# Patient Record
Sex: FEMALE | Race: OTHER | URBAN - METROPOLITAN AREA
[De-identification: names, ages, dates, MRNs, and addresses within clinical notes are randomized per-mention and may not be internally consistent; named-entity substitution may affect disease eponyms.]

---

## 2017-04-04 ENCOUNTER — OFFICE VISIT (OUTPATIENT)
Dept: FAMILY MEDICINE CLINIC | Age: 27
End: 2017-04-04

## 2017-04-04 ENCOUNTER — HOSPITAL ENCOUNTER (OUTPATIENT)
Dept: LAB | Age: 27
Discharge: HOME | End: 2017-04-04

## 2017-04-04 VITALS
HEIGHT: 59 IN | TEMPERATURE: 98.2 F | HEART RATE: 82 BPM | WEIGHT: 174.6 LBS | SYSTOLIC BLOOD PRESSURE: 136 MMHG | BODY MASS INDEX: 35.2 KG/M2 | DIASTOLIC BLOOD PRESSURE: 69 MMHG

## 2017-04-04 DIAGNOSIS — E11.65 TYPE 2 DIABETES MELLITUS WITH HYPERGLYCEMIA, WITHOUT LONG-TERM CURRENT USE OF INSULIN (HCC): Primary | ICD-10-CM

## 2017-04-04 DIAGNOSIS — R73.9 ELEVATED BLOOD SUGAR: ICD-10-CM

## 2017-04-04 DIAGNOSIS — R73.9 ELEVATED BLOOD SUGAR: Primary | ICD-10-CM

## 2017-04-04 LAB
CREAT UR-MCNC: 35.3 MG/DL
EST. AVERAGE GLUCOSE BLD GHB EST-MCNC: 151 MG/DL
GLUCOSE POC: NORMAL MG/DL
HBA1C MFR BLD: 6.9 % (ref 4.2–6.3)
MICROALBUMIN UR-MCNC: 0.57 MG/DL
MICROALBUMIN/CREAT UR-RTO: 16 MG/G (ref 0–30)

## 2017-04-04 PROCEDURE — 83036 HEMOGLOBIN GLYCOSYLATED A1C: CPT | Performed by: FAMILY MEDICINE

## 2017-04-04 PROCEDURE — 82043 UR ALBUMIN QUANTITATIVE: CPT | Performed by: FAMILY MEDICINE

## 2017-04-04 NOTE — PROGRESS NOTES
Coordination of Care  1. Have you been to the ER, urgent care clinic since your last visit? Hospitalized since your last visit? No    2. Have you seen or consulted any other health care providers outside of the [address] [place] [address] since your last visit? Include any pap smears or colon screening.  No    Medications  Medication Reconciliation Performed: no  Patient does need refills     Learning Assessment Complete? yes  Results for orders placed or performed in visit on 04/04/17   AMB POC GLUCOSE BLOOD, BY GLUCOSE MONITORING DEVICE   Result Value Ref Range    Glucose POC non fasting 170 mg/dL

## 2017-04-04 NOTE — PROGRESS NOTES
Subjective:     Patricia Samson is a 32 y.o. female seen for follow up of diabetes. Has been eating more sweets, not really exercising. Noticed glucoses were high, up to 250 one day, and has been taking 4 metformin on some of those days, without side effects. She also has obesity. Diabetic Review of Systems - medication compliance: compliant all of the time, diabetic diet compliance: noncompliant much of the time. Other symptoms and concerns: none. .    Patient Active Problem List   Diagnosis Code    Hyperglycemia due to type 2 diabetes mellitus ([facility] [address] E11.65    Breastfeeding (infant) Z78.9     Family History   Problem Relation Age of Onset    Diabetes Father      Social History   Substance Use Topics    Smoking status: Never Smoker    Smokeless tobacco: Not on file    Alcohol use 0.0 oz/week     0 Standard drinks or equivalent per week      Comment: social        Lab Results  Component Value Date/Time   Cholesterol, total 210 03/15/2016 10:02 AM   HDL Cholesterol 38 03/15/2016 10:02 AM   LDL, calculated 143.2 03/15/2016 10:02 AM   Triglyceride 144 03/15/2016 10:02 AM   CHOL/HDL Ratio 5.5 03/15/2016 10:02 AM       No results found for: CGFR, GFRAA, GFRNA, CRCLT, [id number], CCT, JOSE ALFREDO, CREAPOC, MCREA, REFC7, ACREA, CREA, REFC3, REFC4, BUN, BUNPOC, IBUN, MBUNV, BUNV, NAPOC, NA, PNA, WBNA, K, KPOCT, KI, PLK, WBK, CLPOC, PCL, CL, WBCL, CO2, DIG, DIGP, MDIG      Lab Results   Component Value Date/Time    Hemoglobin A1c 6.3 07/19/2016 02:35 PM    Hemoglobin A1c 9.1 03/15/2016 10:02 AM         Objective:     Vitals 4/4/2017 11/29/2016 7/19/2016 5/27/2016 4/12/2016 2/23/2016   Blood Pressure 136/69 124/71 128/79 115/75 117/69 131/69   Pulse 82 79 82 75 78 73   Temp 98.2 98 98 98.3 98.1 98   Height 4' 10.976\" 4' 10.976\" 4' 11.252\" 4' 10.5\" - 4' 11.055\"   Weight 174 lb 9.6 oz 169 lb 170 lb 166 lb 163 lb 160 lb   BSA 1.82 m2 1.79 m2 1.8 m2 1.76 m2 - 1.74 m2   BMI 35.29 kg/m2 34.16 kg/m2 34.04 kg/m2 34.1 kg/m2 - 32.26 kg/m2     Appearance: alert, well appearing, and in no distress and overweight. Wt up 5 lb. Exam: heart sounds normal rate, regular rhythm, normal S1, S2, no murmurs, rubs, clicks or gallops, chest clear, no hepatosplenomegaly  Lab review:    Assessment/Plan:     diabetes control uncertain. Check A1C and will call her tomorrow if she needs to change dosing on the metformin. Next visit needs fasting lipids and cmp. ICD-10-CM ICD-9-CM    1.  Elevated blood sugar R73.9 790.29 AMB POC GLUCOSE BLOOD, BY GLUCOSE MONITORING DEVICE      HEMOGLOBIN A1C WITH EAG      MICROALBUMIN, UR, RAND W/ MICROALBUMIN/CREA RATIO

## 2017-04-05 DIAGNOSIS — E11.65 TYPE 2 DIABETES MELLITUS WITH HYPERGLYCEMIA, WITHOUT LONG-TERM CURRENT USE OF INSULIN (HCC): ICD-10-CM

## 2017-04-05 RX ORDER — METFORMIN HYDROCHLORIDE 500 MG/1
TABLET, EXTENDED RELEASE ORAL
Qty: 270 TAB | Refills: 1 | Status: SHIPPED | OUTPATIENT
Start: 2017-04-05 | End: 2017-07-19 | Stop reason: SDUPTHER

## 2017-04-05 NOTE — PROGRESS NOTES
Has been running higher glucoses lately and A1C has increased signif, and has been taking 4 metformin sometimes daily. So I am increasing daily dose to 1500mg.   Discussed with pt and sent Rx to pharmacy

## 2017-07-11 ENCOUNTER — CLINICAL SUPPORT (OUTPATIENT)
Dept: FAMILY MEDICINE CLINIC | Age: 27
End: 2017-07-11

## 2017-07-11 ENCOUNTER — HOSPITAL ENCOUNTER (OUTPATIENT)
Dept: LAB | Age: 27
Discharge: HOME | End: 2017-07-11

## 2017-07-11 DIAGNOSIS — Z13.9 ENCOUNTER FOR SCREENING: Primary | ICD-10-CM

## 2017-07-11 DIAGNOSIS — E11.65 TYPE 2 DIABETES MELLITUS WITH HYPERGLYCEMIA, WITHOUT LONG-TERM CURRENT USE OF INSULIN (HCC): ICD-10-CM

## 2017-07-11 LAB
ALBUMIN SERPL BCP-MCNC: 4.2 G/DL (ref 3.5–5)
ALBUMIN/GLOB SERPL: 1.1 {RATIO} (ref 1.1–2.2)
ALP SERPL-CCNC: 89 U/L (ref 45–117)
ALT SERPL-CCNC: 63 U/L (ref 12–78)
ANION GAP BLD CALC-SCNC: 10 MMOL/L (ref 5–15)
AST SERPL W P-5'-P-CCNC: 46 U/L (ref 15–37)
BILIRUB SERPL-MCNC: 0.3 MG/DL (ref 0.2–1)
BUN SERPL-MCNC: 7 MG/DL (ref 6–20)
BUN/CREAT SERPL: 13 (ref 12–20)
CALCIUM SERPL-MCNC: 9.2 MG/DL (ref 8.5–10.1)
CHLORIDE SERPL-SCNC: 100 MMOL/L (ref 97–108)
CHOLEST SERPL-MCNC: 198 MG/DL
CO2 SERPL-SCNC: 25 MMOL/L (ref 21–32)
CREAT SERPL-MCNC: 0.53 MG/DL (ref 0.55–1.02)
GLOBULIN SER CALC-MCNC: 3.8 G/DL (ref 2–4)
GLUCOSE SERPL-MCNC: 142 MG/DL (ref 65–100)
HDLC SERPL-MCNC: 31 MG/DL
HDLC SERPL: 6.4 {RATIO} (ref 0–5)
LDLC SERPL CALC-MCNC: 96 MG/DL (ref 0–100)
LIPID PROFILE,FLP: ABNORMAL
POTASSIUM SERPL-SCNC: 4.3 MMOL/L (ref 3.5–5.1)
PROT SERPL-MCNC: 8 G/DL (ref 6.4–8.2)
SODIUM SERPL-SCNC: 135 MMOL/L (ref 136–145)
TRIGL SERPL-MCNC: 355 MG/DL (ref ?–150)
VLDLC SERPL CALC-MCNC: 71 MG/DL

## 2017-07-11 PROCEDURE — 80053 COMPREHEN METABOLIC PANEL: CPT | Performed by: FAMILY MEDICINE

## 2017-07-11 PROCEDURE — 80061 LIPID PANEL: CPT | Performed by: FAMILY MEDICINE

## 2017-07-11 NOTE — PROGRESS NOTES
Patient here for fasting labs only per Dr. Andreea Jamil MD, labs drawn was an Lipid, CMP in the [place], patient left lab with no bleeding, no pain, and feeling well. Patient was advise that a Dr or Nurse will call with the results if abnormal and if normal no phone call will be done. Also the patient was advised she/he can discuss the results at next visit with the Dr. Sherita Rowland, Medical Assistant.

## 2017-07-19 ENCOUNTER — OFFICE VISIT (OUTPATIENT)
Dept: FAMILY MEDICINE CLINIC | Age: 27
End: 2017-07-19

## 2017-07-19 ENCOUNTER — HOSPITAL ENCOUNTER (OUTPATIENT)
Dept: LAB | Age: 27
Discharge: HOME | End: 2017-07-19

## 2017-07-19 VITALS
WEIGHT: 171 LBS | DIASTOLIC BLOOD PRESSURE: 84 MMHG | SYSTOLIC BLOOD PRESSURE: 130 MMHG | BODY MASS INDEX: 34.57 KG/M2 | HEART RATE: 84 BPM | TEMPERATURE: 98.7 F

## 2017-07-19 DIAGNOSIS — E11.65 TYPE 2 DIABETES MELLITUS WITH HYPERGLYCEMIA, WITHOUT LONG-TERM CURRENT USE OF INSULIN (HCC): ICD-10-CM

## 2017-07-19 DIAGNOSIS — E11.65 TYPE 2 DIABETES MELLITUS WITH HYPERGLYCEMIA, WITHOUT LONG-TERM CURRENT USE OF INSULIN (HCC): Primary | ICD-10-CM

## 2017-07-19 DIAGNOSIS — Z23 ENCOUNTER FOR IMMUNIZATION: ICD-10-CM

## 2017-07-19 LAB
EST. AVERAGE GLUCOSE BLD GHB EST-MCNC: 148 MG/DL
GLUCOSE POC: NORMAL MG/DL
HBA1C MFR BLD: 6.8 % (ref 4.2–6.3)

## 2017-07-19 PROCEDURE — 83036 HEMOGLOBIN GLYCOSYLATED A1C: CPT | Performed by: NURSE PRACTITIONER

## 2017-07-19 RX ORDER — METFORMIN HYDROCHLORIDE 500 MG/1
TABLET, EXTENDED RELEASE ORAL
Qty: 270 TAB | Refills: 1 | Status: SHIPPED | OUTPATIENT
Start: 2017-07-19 | End: 2017-12-20 | Stop reason: SDUPTHER

## 2017-07-19 NOTE — LETTER
2017 Ms. Sandy Robert  1990 # [id number] [facility] To Whom It May Concern: Ms. Sandy Robert needs her annual dilated retinal exam for her diabetes. After your examination, kindly fax the exam results including diabetes findings to 
[phone],   ATTENTION: Mg Schooling You may call our office at [phone] with any questions. Thank you! Sincerely, 
 
 
 
Dr. Juana Tavares, DNP, MSN, RN, FNP-BC, BC-ADM [facility]

## 2017-07-19 NOTE — PROGRESS NOTES
Coordination of Care  1. Have you been to the ER, urgent care clinic since your last visit? Hospitalized since your last visit? No    2. Have you seen or consulted any other health care providers outside of the [address] [place] [address] since your last visit? Include any pap smears or colon screening.  No    Medications  Medication Reconciliation Performed: no  Patient does need refills     Learning Assessment Complete? yes    Results for orders placed or performed in visit on 07/19/17   AMB POC GLUCOSE BLOOD, BY GLUCOSE MONITORING DEVICE   Result Value Ref Range    Glucose  non fasting mg/dL

## 2017-07-19 NOTE — PROGRESS NOTES
Taj Davison is a 32 y.o. female who presents for routine immunizations. Pt received Pneumococcal 23 today. She denies any symptoms , reactions or allergies that would exclude them from being immunized today. Risks and adverse reactions were discussed and the VIS was given to them. All questions were addressed. She was observed for 15 min post injection. There were no reactions observed.     Erlene Salines, LPN

## 2017-07-19 NOTE — PROGRESS NOTES
Assessment/Plan:       ICD-10-CM ICD-9-CM    1. Type 2 diabetes mellitus with hyperglycemia, without long-term current use of insulin (HCC) E11.65 250.00 AMB POC GLUCOSE BLOOD, BY GLUCOSE MONITORING DEVICE     790.29 metFORMIN ER (GLUCOPHAGE XR) 500 mg tablet      HEMOGLOBIN A1C WITH EAG   2. Encounter for immunization Z23 V03.89 PNEUMOCOCCAL POLYSACCHARIDE VACCINE, 23-VALENT, ADULT OR IMMUNOSUPPRESSED PT DOSE,    Greater than 50% of this 25 minute visit was spent in face-to-face counseling/coordination of care regarding diabetes management. Follow-up Disposition:  Return in about 3 months (around 10/19/2017) for diabetes. [facility]  Subjective:   Scott Parra is a 32 y.o. OTHER female who speaks Divehi. Chief Complaint   Patient presents with    Diabetes    Immunization/Injection   Brought in medications? yes Last took them: today, took 1 this morning, 2 at night; feels well. Measuring glucoses? 120, 130, 105, 170 a few times. Only when she went to a party. Last ate: today. Activity/exercise: she is walking inside the house, was scared of a man outside. ROS:   no polyuria or polydipsia, no chest pain, dyspnea or TIA's, no numbness, tingling or pain in extremities. Social History: She reports that she has never smoked. She has never used smokeless tobacco. She reports that she drinks alcohol. She reports that she does not use illicit drugs. Family History: Her family history includes Diabetes in her father. Outpatient Medications Prior to Visit   Medication Sig Dispense Refill    metFORMIN ER (GLUCOPHAGE XR) 500 mg tablet 1 am after breakfast, 2 pm after supper(1 despues de desayuno, dos despues de sean) 270 Tab 1     No facility-administered medications prior to visit. Surgical History: No past surgical history on file.   Objective:     Vitals:    07/19/17 1306   BP: 130/84   Pulse: 84   Temp: 98.7 °F (37.1 °C)   TempSrc: Oral   Weight: 171 lb (77.6 kg)    No LMP recorded. Patient is not currently having periods (Reason: IUD). Results for orders placed or performed in visit on 07/19/17   AMB POC GLUCOSE BLOOD, BY GLUCOSE MONITORING DEVICE   Result Value Ref Range    Glucose  non fasting mg/dL      Wt Readings from Last 2 Encounters:   07/19/17 171 lb (77.6 kg)   04/04/17 174 lb 9.6 oz (79.2 kg)    Weight decreased; Hemoglobin A1c   Date Value Ref Range Status   04/04/2017 6.9 (H) 4.2 - 6.3 % Final   07/19/2016 6.3 4.2 - 6.3 % Final    A1C increased but < 7   Lab Results   Component Value Date/Time    Microalbumin/Creat ratio (mg/g creat) 16 04/04/2017 11:18 AM    Microalbumin,urine random 0.57 04/04/2017 11:18 AM    Creatinine 0.53 07/11/2017 09:03 AM      Lab Results   Component Value Date/Time    GFR est AA >60 07/11/2017 09:03 AM    GFR est non-AA >60 07/11/2017 09:03 AM       Lab Results   Component Value Date/Time    Cholesterol, total 198 07/11/2017 09:03 AM    HDL Cholesterol 31 07/11/2017 09:03 AM    LDL, calculated 96 07/11/2017 09:03 AM    Triglyceride 355 07/11/2017 09:03 AM    CHOL/HDL Ratio 6.4 07/11/2017 09:03 AM      Lab Results   Component Value Date/Time    ALT (SGPT) 63 07/11/2017 09:03 AM    AST (SGOT) 46 07/11/2017 09:03 AM    Alk. phosphatase 89 07/11/2017 09:03 AM    Bilirubin, total 0.3 07/11/2017 09:03 AM     Constitutional: She appears well-developed. Eyes: EOM are normal. Pupils are equal, round, and reactive to light. Neck: Neck supple. No thyromegaly present. Cardiovascular: Normal rate, regular rhythm, normal heart sounds and intact distal pulses. No murmur heard. Pulmonary/Chest: Effort normal and breath sounds normal.   Musculoskeletal: She exhibits no edema. No ulcers of the lower extremities. Assessment/Plan:   Diagnoses and all orders for this visit:    1.  Type 2 diabetes mellitus with hyperglycemia, without long-term current use of insulin (HCC)  -     AMB POC GLUCOSE BLOOD, BY GLUCOSE MONITORING DEVICE  -     metFORMIN ER (GLUCOPHAGE XR) 500 mg tablet; 1 am after breakfast, 2 pm after supper(1 despues de desayuno, dos despues de sean)  -     HEMOGLOBIN A1C WITH EAG; Future    2. Encounter for immunization  -     Pneumococcal polysaccharide vaccine, 23-valent, adult or immunosuppressed pt dose    Diabetes Mellitus type 2, under Good  control. Blood pressure under Good control. Greater than 50% of this 25 minute visit was spent in face-to-face counseling/coordination of care regarding diabetes management. Follow-up Disposition:  Return in about 3 months (around 10/19/2017) for diabetes. I gave her eye letter to take to optometry re: diabetes eye exam.  Last one 4/2016 and attention to diabetes not done. Jovan Gibson, MSN, RN, FNP-BC, BC-ADM  Aroldo Blanton expressed understanding of this plan.

## 2017-12-20 ENCOUNTER — OFFICE VISIT (OUTPATIENT)
Dept: FAMILY MEDICINE CLINIC | Age: 27
End: 2017-12-20

## 2017-12-20 ENCOUNTER — HOSPITAL ENCOUNTER (OUTPATIENT)
Dept: LAB | Age: 27
Discharge: HOME | End: 2017-12-20

## 2017-12-20 VITALS
WEIGHT: 168 LBS | HEIGHT: 59 IN | HEART RATE: 75 BPM | BODY MASS INDEX: 33.87 KG/M2 | TEMPERATURE: 97.3 F | DIASTOLIC BLOOD PRESSURE: 82 MMHG | SYSTOLIC BLOOD PRESSURE: 122 MMHG

## 2017-12-20 DIAGNOSIS — Z23 ENCOUNTER FOR IMMUNIZATION: ICD-10-CM

## 2017-12-20 DIAGNOSIS — Z13.1 SCREENING FOR DIABETES MELLITUS: ICD-10-CM

## 2017-12-20 DIAGNOSIS — E11.65 TYPE 2 DIABETES MELLITUS WITH HYPERGLYCEMIA, WITHOUT LONG-TERM CURRENT USE OF INSULIN (HCC): Primary | ICD-10-CM

## 2017-12-20 DIAGNOSIS — E11.65 TYPE 2 DIABETES MELLITUS WITH HYPERGLYCEMIA, WITHOUT LONG-TERM CURRENT USE OF INSULIN (HCC): ICD-10-CM

## 2017-12-20 LAB
EST. AVERAGE GLUCOSE BLD GHB EST-MCNC: 166 MG/DL
GLUCOSE POC: NORMAL MG/DL
HBA1C MFR BLD: 7.4 % (ref 4.2–6.3)

## 2017-12-20 PROCEDURE — 83036 HEMOGLOBIN GLYCOSYLATED A1C: CPT | Performed by: NURSE PRACTITIONER

## 2017-12-20 RX ORDER — METFORMIN HYDROCHLORIDE 500 MG/1
TABLET, EXTENDED RELEASE ORAL
Qty: 270 TAB | Refills: 1 | Status: SHIPPED | OUTPATIENT
Start: 2017-12-20 | End: 2018-01-11 | Stop reason: SDUPTHER

## 2017-12-20 NOTE — PROGRESS NOTES
Assessment/Plan:       ICD-10-CM ICD-9-CM    1. Type 2 diabetes mellitus with hyperglycemia, without long-term current use of insulin (HCC) E11.65 250.00 HEMOGLOBIN A1C WITH EAG     790.29 metFORMIN ER (GLUCOPHAGE XR) 500 mg tablet   2. Screening for diabetes mellitus Z13.1 V77.1 AMB POC GLUCOSE BLOOD, BY GLUCOSE MONITORING DEVICE    Greater than 50% of this 25 minute visit was spent in face-to-face counseling/coordination of care regarding diabetes management. Follow-up Disposition: Not on File Changes made:      [facility] [address], [address] [facility] [address] [place] [address] [address]  [address] [facility] [address]  Phone: [phone] Fax: [phone]      [facility]  Subjective:     Chief Complaint   Patient presents with    Diabetes     follow up     Chen Olivares is a 32 y.o. OTHER female who speaks Ukrainian. Last night/this morning or typical day, medications/food:   Work (in or outside the home): yes   Physical Activity in addition to working? yes  Measuring glucoses? yes and 15 days ago, 150, 160  Outpatient Medications Prior to Visit   Medication Sig Dispense Refill    metFORMIN ER (GLUCOPHAGE XR) 500 mg tablet 1 am after breakfast, 2 pm after supper(1 despues de desayuno, dos despues de sean) 270 Tab 1     No facility-administered medications prior to visit. Brought in medications? no Taking medications as prescribed? Yes Exercise: was walking; but now since cold she has a machine. ROS:   no polyuria or polydipsia, no chest pain, dyspnea or TIA's, no numbness, tingling or pain in extremities. Social History: She reports that she has never smoked. She has never used smokeless tobacco. She reports that she drinks alcohol. She reports that she does not use illicit drugs. Family History: Her family history includes Diabetes in her father. Surgical History: No past surgical history on file.   Objective:     Vitals:    12/20/17 1304   BP: 122/82 Pulse: 75   Temp: 97.3 °F (36.3 °C)   TempSrc: Oral   Weight: 168 lb (76.2 kg)   Height: 4' 11.25\" (1.505 m)    No LMP recorded. Patient is not currently having periods (Reason: IUD). Results for orders placed or performed in visit on 12/20/17   AMB POC GLUCOSE BLOOD, BY GLUCOSE MONITORING DEVICE   Result Value Ref Range    Glucose   non fasting mg/dL      Wt Readings from Last 2 Encounters:   12/20/17 168 lb (76.2 kg)   07/19/17 171 lb (77.6 kg)    Weight decreased; Hemoglobin A1c   Date Value Ref Range Status   07/19/2017 6.8 (H) 4.2 - 6.3 % Final   04/04/2017 6.9 (H) 4.2 - 6.3 % Final    A1C decreased;   Lab Results   Component Value Date/Time    Microalbumin/Creat ratio (mg/g creat) 16 04/04/2017 11:18 AM    Microalbumin,urine random 0.57 04/04/2017 11:18 AM    Creatinine 0.53 07/11/2017 09:03 AM      Lab Results   Component Value Date/Time    GFR est AA >60 07/11/2017 09:03 AM    GFR est non-AA >60 07/11/2017 09:03 AM       Lab Results   Component Value Date/Time    Cholesterol, total 198 07/11/2017 09:03 AM    HDL Cholesterol 31 07/11/2017 09:03 AM    LDL, calculated 96 07/11/2017 09:03 AM    Triglyceride 355 07/11/2017 09:03 AM    CHOL/HDL Ratio 6.4 07/11/2017 09:03 AM      Lab Results   Component Value Date/Time    ALT (SGPT) 63 07/11/2017 09:03 AM    AST (SGOT) 46 07/11/2017 09:03 AM    Alk. phosphatase 89 07/11/2017 09:03 AM    Bilirubin, total 0.3 07/11/2017 09:03 AM     Constitutional: She appears well-developed. Eyes: EOM are normal. Pupils are equal, round, and reactive to light. Neck: Neck supple. No thyromegaly present. Cardiovascular: Normal rate, regular rhythm, normal heart sounds and intact distal pulses. No murmur heard. Pulmonary/Chest: Effort normal and breath sounds normal.   Musculoskeletal: She exhibits no edema. No ulcers of the lower extremities. Assessment/Plan:   Diagnoses and all orders for this visit:    1.  Type 2 diabetes mellitus with hyperglycemia, without long-term current use of insulin (HCC)  -     HEMOGLOBIN A1C WITH EAG; Future  -     metFORMIN ER (GLUCOPHAGE XR) 500 mg tablet; 1 am after breakfast, 2 pm after supper(1 despues de desayuno, dos despues de sean)    2. Screening for diabetes mellitus  -     AMB POC GLUCOSE BLOOD, BY GLUCOSE MONITORING DEVICE    OK for flu vaccine; labs today; may have return appointment 5 months (has enough meds for 6 months); keep up the good work with exercising on the machine in her house  Diabetes Mellitus type 2, under Good control. Blood pressure under Good control. Greater than 50% of this 25 minute visit was spent in face-to-face counseling/coordination of care regarding diabetes management. Alexey Crowder DNP, FNP-BC, BC-ADM  Santos Marroquin expressed understanding of this plan.

## 2017-12-20 NOTE — MR AVS SNAPSHOT
Visit Information Nancy Clark Personal Médico Departamento Teléfono del Dep. Número de visita  
 12/20/2017  1:00 PM Devaughn Garg NP [address] [phone] Follow-up Instructions Return in about 5 months (around 5/20/2018) for primary care needs 5-6 months. Upcoming Health Maintenance Date Due  
 EYE EXAM RETINAL OR DILATED Q1 4/11/2017 Influenza Age 5 to Adult 8/1/2017 FOOT EXAM Q1 11/29/2017 HEMOGLOBIN A1C Q6M 1/19/2018 MICROALBUMIN Q1 4/4/2018 LIPID PANEL Q1 7/11/2018 PAP AKA CERVICAL CYTOLOGY 9/8/2019 DTaP/Tdap/Td series (2 - Td) 10/27/2024 [place]  Review Complete El: 12/20/2017 Por: Devaughn Garg NP  
 Mickeal Augustine del:  12/20/2017 Intensidad Anotado Tipo de reacción [employer] Pork Derived (Porcine)  02/23/2016    Rash Vacunas actuales AGRYWRAFK el:  11/29/2016 Pat Nation Influenza Vaccine (Quad) PF 11/29/2016 Pneumococcal Polysaccharide (PPSV-23) 7/19/2017 No revisadas esta visita You Were Diagnosed With   
  
 Tereza Balderas Type 2 diabetes mellitus with hyperglycemia, without long-term current use of insulin (HCC)    -  Primary ICD-10-CM: E11.65 ICD-9-CM: 250.00, 790.29 Screening for diabetes mellitus     ICD-10-CM: Z13.1 ICD-9-CM: V77.1 Partes vitales PS Pulso Temperatura [place] ( percentil de crecimiento) Peso (percentil de crecimiento) BMI (IMC)  
 122/82 (BP 1 Location: Left arm, BP Patient Position: Sitting) 75 97.3 °F (36.3 °C) (Oral) 4' 11.25\" (1.505 m) 168 lb (76.2 kg) 33.64 kg/m2 Estado obstétrico Estatus de tabaquísmo IUD Never Smoker Historial de signos vitales BMI and BSA Data Body Mass Index Body Surface Area  
 33.64 kg/m 2 1.78 m 2 [facility] Name Phone [facility], [address] [place] [address] Decker lista de medicamentos actualizada Lista actualizada el: 17  2:23 PM.  Lawerence Coup use decker lista de medicamentos más reciente. metFORMIN  mg tablet También conocido milagro:  GLUCOPHAGE XR  
1 am after breakfast, 2 pm after supper(1 despues de desayuno, dos despues de sean) Recetas Enviado a la [place] Refills  
 metFORMIN ER (GLUCOPHAGE XR) 500 mg tablet 1 Si am after breakfast, 2 pm after supper(1 despues de desayuno, dos despues de sean) Class: Normal  
 Pharmacy: [facility] [id number] [place] [address], [address] [place] [address] #: [phone] Hicimos lo siguiente AMB POC GLUCOSE BLOOD, BY GLUCOSE MONITORING DEVICE [97928 CPT(R)] Instrucciones de seguimiento Return in about 5 months (around 2018) for primary care needs 5-6 months. Introducing [facility]! [facility] introduce portal paciente MyChart . Ahora se puede acceder a partes de decker expediente médico, enviar por correo electrónico la oficina de decker médico y solicitar renovaciones de medicamentos en línea. En decker navegador de Internet , Berto McAndrews a [email or web address] Amrit clic en el usuario por Shanti Halon? Tawana Pagoda clic aquí en la sesión Evern Blazing. Verá la página de registro [place]. Ingrese decker código de [employer] meng y milagro aparece a continuación. Usted no tendrá que [employer] código después de kenny completado el proceso de registro . Si usted no se inscribe antes de la fecha de caducidad , debe solicitar un nuevo código. · MyChart Código de acceso : [id number] Expires: 3/20/2018  2:23 PM 
 
Ingresa los últimos cuatro dígitos de decker Número de Seguro Social ( xxxx ) y fecha de nacimiento ( dd / mm / aaaa ) milagro se indica y amrit clic en Enviar. Usted será llevado a la siguiente página de registro . Crear un ID MyChart .  Esta será decker ID de inicio de sesión de MyChart y no puede ser [place] , por lo que pensar en karla que es workman y fácil de recordar . Crear karla contraseña MyChart . Usted puede cambiar decker contraseña en cualquier momento . Ingrese decker Password Reset de preguntas y [place] . [place] se puede utilizar en un momento posterior si usted olvida decker contraseña. Introduzca decker dirección de correo electrónico . Mathew Baer recibirá karla notificación por correo electrónico cuando la nueva información está disponible en MyChart . Carter Clay clic en Registrarse. [place] tomas y descargar porciones de decker expediente médico. 
Liseth clic en el enlace de descarga del menú Resumen para descargar karla copia portátil de decker información médica . Si tiene [employer] , por favor visite la sección de preguntas frecuentes del sitio web MyChart . Recuerde, MyChart NO es que se utilizará para las necesidades urgentes. Para emergencias médicas , llame al 911 . Ahora disponible en decker iPhone y Android ! Por favor proporcione prashanth resumen de la documentación de cuidado a decker próximo proveedor. If you have any questions after today's visit, please call [phone].

## 2017-12-20 NOTE — PROGRESS NOTES
Coordination of Care  1. Have you been to the ER, urgent care clinic since your last visit? Hospitalized since your last visit? No    2. Have you seen or consulted any other health care providers outside of the [address] [place] [address] since your last visit? Include any pap smears or colon screening. No    Medications  Does the patient need refills?  YES    Learning Assessment Complete? yes    Results for orders placed or performed in visit on 12/20/17   AMB POC GLUCOSE BLOOD, BY GLUCOSE MONITORING DEVICE   Result Value Ref Range    Glucose   non fasting mg/dL

## 2017-12-20 NOTE — PROGRESS NOTES
Avs discussed with Attila Escobedo by Discharge Nurse Dianne Olmos LPN with  [facility]. Discussed medication prescribed today. Pt will receive flu shot today. AVS printed and given to patient Dianne Olmos LPN         Requests flu vaccine. Denies fever and egg allergy. VIS information sheet given to patient. Explained possible s/e. Reviewed s/sx indicating need to be seen in ER. Patient had no adverse reaction at time of discharge. Entered into VIIS.

## 2017-12-21 NOTE — PROGRESS NOTES
A1c has gone up to 7.4, should be under 7. If you have been consistently taking 3 metformins a day, we need you to increase to 4 metformins a day (2 po bid). Return 3 months instead of 5 months.

## 2018-01-10 NOTE — PROGRESS NOTES
I called pt today with [employer]  # [phone] and gave message from provider. Pt is taking Metformin as prescribed. She will increase it to 2 po BID. She knows to RTC in 3 months.  Reina Whitfield RN

## 2018-01-11 DIAGNOSIS — E11.65 TYPE 2 DIABETES MELLITUS WITH HYPERGLYCEMIA, WITHOUT LONG-TERM CURRENT USE OF INSULIN (HCC): ICD-10-CM

## 2018-01-11 RX ORDER — METFORMIN HYDROCHLORIDE 500 MG/1
TABLET, EXTENDED RELEASE ORAL
Qty: 360 TAB | Refills: 1 | Status: SHIPPED | OUTPATIENT
Start: 2018-01-11 | End: 2018-10-03 | Stop reason: SDUPTHER

## 2018-10-03 DIAGNOSIS — E11.65 TYPE 2 DIABETES MELLITUS WITH HYPERGLYCEMIA, WITHOUT LONG-TERM CURRENT USE OF INSULIN (HCC): ICD-10-CM

## 2018-10-03 RX ORDER — METFORMIN HYDROCHLORIDE 500 MG/1
TABLET, EXTENDED RELEASE ORAL
Qty: 360 TAB | Refills: 0 | Status: SHIPPED | OUTPATIENT
Start: 2018-10-03 | End: 2018-10-04 | Stop reason: SDUPTHER

## 2018-10-03 NOTE — TELEPHONE ENCOUNTER
Has appt on 11/12/18 scheduled but is running out of medicine. Will refill per registration who scheduled f/u appt. Given 3 months, no refills.

## 2018-10-04 DIAGNOSIS — E11.65 TYPE 2 DIABETES MELLITUS WITH HYPERGLYCEMIA, WITHOUT LONG-TERM CURRENT USE OF INSULIN (HCC): Primary | ICD-10-CM

## 2018-10-04 RX ORDER — METFORMIN HYDROCHLORIDE 500 MG/1
TABLET, EXTENDED RELEASE ORAL
Qty: 360 TAB | Refills: 0 | Status: SHIPPED | OUTPATIENT
Start: 2018-10-04 | End: 2018-11-12

## 2018-11-12 ENCOUNTER — OFFICE VISIT (OUTPATIENT)
Dept: FAMILY MEDICINE CLINIC | Age: 28
End: 2018-11-12

## 2018-11-12 VITALS
DIASTOLIC BLOOD PRESSURE: 75 MMHG | BODY MASS INDEX: 30.6 KG/M2 | WEIGHT: 152.8 LBS | TEMPERATURE: 98.1 F | HEART RATE: 67 BPM | SYSTOLIC BLOOD PRESSURE: 112 MMHG

## 2018-11-12 DIAGNOSIS — Z13.9 ENCOUNTER FOR SCREENING: ICD-10-CM

## 2018-11-12 DIAGNOSIS — E11.9 CONTROLLED TYPE 2 DIABETES MELLITUS WITHOUT COMPLICATION, WITHOUT LONG-TERM CURRENT USE OF INSULIN (HCC): Primary | ICD-10-CM

## 2018-11-12 DIAGNOSIS — Z3A.10 10 WEEKS GESTATION OF PREGNANCY: ICD-10-CM

## 2018-11-12 LAB — GLUCOSE POC: NORMAL MG/DL

## 2018-11-12 NOTE — Clinical Note
Patient is 10 weeks pregnant and has stopped all diabetes medication. Her fasting glucoses range from 112 to 150, average of 125. Her measures 2 hours after breakfast range from 93 to 160, average 120. With [facility] closing and no insurance, what is the best way for me to refer her to you to manage type 2 diabetes in pregnancy?

## 2018-11-12 NOTE — PROGRESS NOTES
Coordination of Care  1. Have you been to the ER, urgent care clinic since your last visit? Hospitalized since your last visit? No    2. Have you seen or consulted any other health care providers outside of the [address] [place] [address] since your last visit? Include any pap smears or colon screening. Yes When: Clinic in [place] for pregnancy    Does the patient need refills? YES    Learning Assessment Complete?  yes  Depression Screening complete in the past 12 months? yes     Results for orders placed or performed in visit on 11/12/18   AMB POC GLUCOSE BLOOD, BY GLUCOSE MONITORING DEVICE   Result Value Ref Range    Glucose POC nf 78 mg/dL

## 2018-11-12 NOTE — PROGRESS NOTES
Assessment/Plan:       ICD-10-CM ICD-9-CM    1. Controlled type 2 diabetes mellitus without complication, without long-term current use of insulin ([facility]) E11.9 250.00    2. Encounter for screening Z13.9 V82.9 AMB POC GLUCOSE BLOOD, BY GLUCOSE MONITORING DEVICE   3. 10 weeks gestation of pregnancy Z3A.10 V22.2     Plan follow up with endocrinology. Follow-up Disposition: Not on [address]  [address] [facility] [address]  Phone: [phone] Fax: [phone]      [facility]  Subjective:     Chief Complaint   Patient presents with    Other     patient is pregnant and has stopped medication for diabetes    Marc Villaseñor is a 25 y.o. OTHER female who speaks Albanian.  used? yes She has kept physically active. Her first check up is November 30. HPI: She is 10 weeks pregnant. Going to [place] clinic. ROS:   No increased frequency of urination, no increased thirst; no chest pain, dyspnea or TIA's; no numbness, tingling or pain in extremities; no reports of hypoglycemia. Diabetes   Brought in medications? Off meds for 1 month. Medications:  No current outpatient medications on file. Social History: She reports that  has never smoked. she has never used smokeless tobacco. She reports that she drinks alcohol. She reports that she does not use drugs. Family History: Her family history includes Diabetes in her father. Objective:     Vitals:    11/12/18 1334   BP: 112/75   Pulse: 67   Temp: 98.1 °F (36.7 °C)   TempSrc: Oral   Weight: 152 lb 12.8 oz (69.3 kg)    No LMP recorded. Patient is pregnant.     Results for orders placed or performed in visit on 11/12/18   AMB POC GLUCOSE BLOOD, BY GLUCOSE MONITORING DEVICE   Result Value Ref Range    Glucose POC nf 78 mg/dL      Wt Readings from Last 2 Encounters:   11/12/18 152 lb 12.8 oz (69.3 kg)   12/20/17 168 lb (76.2 kg)    Weight decreased; Hemoglobin A1c   Date Value Ref Range Status   12/20/2017 7.4 (H) 4.2 - 6.3 % Final   07/19/2017 6.8 (H) 4.2 - 6.3 % Final    A1C increased;   Lab Results   Component Value Date/Time    Microalbumin/Creat ratio (mg/g creat) 16 04/04/2017 11:18 AM    Microalbumin,urine random 0.57 04/04/2017 11:18 AM    Creatinine 0.53 (L) 07/11/2017 09:03 AM      Lab Results   Component Value Date/Time    GFR est AA >60 07/11/2017 09:03 AM    GFR est non-AA >60 07/11/2017 09:03 AM       Lab Results   Component Value Date/Time    Cholesterol, total 198 07/11/2017 09:03 AM    HDL Cholesterol 31 07/11/2017 09:03 AM    LDL, calculated 96 07/11/2017 09:03 AM    Triglyceride 355 (H) 07/11/2017 09:03 AM    CHOL/HDL Ratio 6.4 (H) 07/11/2017 09:03 AM      Lab Results   Component Value Date/Time    ALT (SGPT) 63 07/11/2017 09:03 AM    AST (SGOT) 46 (H) 07/11/2017 09:03 AM    Alk. phosphatase 89 07/11/2017 09:03 AM    Bilirubin, total 0.3 07/11/2017 09:03 AM     Constitutional: She appears well-developed. Eyes: EOM are normal. Pupils are equal, round, and reactive to light. Neck: Neck supple. No thyromegaly present. Cardiovascular: Normal rate, regular rhythm, normal heart sounds and intact distal pulses. No murmur heard. Pulmonary/Chest: Effort normal and breath sounds normal.   Musculoskeletal: She exhibits no edema. No ulcers of the lower extremities. Assessment/Plan:   Diagnoses and all orders for this visit:    1. Controlled type 2 diabetes mellitus without complication, without long-term current use of insulin ([address]    2. Encounter for screening  -     AMB POC GLUCOSE BLOOD, BY GLUCOSE MONITORING DEVICE    3. 10 weeks gestation of pregnancy    DO NOT DRINK ALCOHOL. Follow-up Disposition: Not on File      Shine Sheldon DNP, FNP-BC, BC-ADM  Valeria Pastrana expressed understanding of this plan.

## 2018-11-12 NOTE — PATIENT INSTRUCTIONS
Lab Results   Component Value Date/Time    Hemoglobin A1c 7.4 (H) 12/20/2017 02:32 PM    Hemoglobin A1c 6.8 (H) 07/19/2017 01:50 PM    Hemoglobin A1c 6.9 (H) 04/04/2017 11:18 AM     Wt Readings from Last 3 Encounters:   11/12/18 152 lb 12.8 oz (69.3 kg)   12/20/17 168 lb (76.2 kg)   07/19/17 171 lb (77.6 kg)     She was diagnosed with type 2 diabetes on 12/20/17. She has lost weight since then. Her fasting glucoses are ranging 112 to 150 at 10 weeks pregnant. I am recommending a referral to endocrinology for management of type 2 diabetes vs gestational diabetes during pregnancy.

## 2018-11-12 NOTE — PROGRESS NOTES
AVS printed, given and reviewed. This has been fully explained to the patient, who indicates understanding and agrees with plan. No further questions at this time.  Jed Veliz RN

## 2018-11-12 NOTE — PROGRESS NOTES
Zenobia Saluda am transitioning my clinic from [address] [place] [address] to [address] [place] [address] on the 1st Wed of the month and [place] on the 3rd Wed of the month starting in January. I suggest she restart her metformin and begin glyburide 2.5 mg before dinner to help keep her fasting sugars under 95 in the morning. We can try to see if she can come to [facility] sometime this winter as I am full until then. Thanks,  Lena Olson  ===View-only below this line===    ----- Message -----  From: Neptali Ribera NP  Sent: 11/12/2018   1:56 PM  To: Patrick Monroe MD    Patient is 10 weeks pregnant and has stopped all diabetes medication. Her fasting glucoses range from 112 to 150, average of 125. Her measures 2 hours after breakfast range from 93 to 160, average 120. With [facility] and no insurance, what is the best way for me to refer her to you to manage type 2 diabetes in pregnancy?

## 2018-11-17 ENCOUNTER — TELEPHONE (OUTPATIENT)
Dept: FAMILY MEDICINE CLINIC | Age: 28
End: 2018-11-17

## 2018-11-17 DIAGNOSIS — O24.111 PRE-EXISTING TYPE 2 DIABETES MELLITUS DURING PREGNANCY IN FIRST TRIMESTER: Primary | ICD-10-CM

## 2018-11-17 RX ORDER — METFORMIN HYDROCHLORIDE 500 MG/1
500 TABLET ORAL
Qty: 180 TAB | Refills: 0 | Status: SHIPPED | OUTPATIENT
Start: 2018-11-17 | End: 2019-11-08

## 2018-11-17 RX ORDER — GLYBURIDE 2.5 MG/1
2.5 TABLET ORAL
Qty: 90 TAB | Refills: 0 | Status: SHIPPED | OUTPATIENT
Start: 2018-11-17 | End: 2019-11-08

## 2018-11-17 NOTE — TELEPHONE ENCOUNTER
Per Dr. Elo Ortiz, patient is to restart metformin and to take glyburide 2.5 mg before dinner. Keep fasting glucoses under 95 in the morning. He is at [facility] every 3rd Wednesday of the month. He recommends she follow up with him sometime this winter, as he is booked until then. I called patient with  Lizbeth Becker to convey the following:  Start this week taking metformin 500mg after breakfast.  Also take glyburide 2.5mg before dinner. Measure fasting glucoses. These should be under 95. Starting week 2, add the second metformin 500mg after dinner. These were sent to the [facility] on [address]: turn off the lights. Patient will need to be called to have a new patient appointment scheduled with Dr. Elo Ortiz.

## 2019-09-24 ENCOUNTER — TELEPHONE (OUTPATIENT)
Dept: FAMILY MEDICINE CLINIC | Age: 29
End: 2019-09-24

## 2019-09-24 DIAGNOSIS — E11.65 TYPE 2 DIABETES MELLITUS WITH HYPERGLYCEMIA, WITHOUT LONG-TERM CURRENT USE OF INSULIN (HCC): Primary | ICD-10-CM

## 2019-09-24 NOTE — TELEPHONE ENCOUNTER
Homa Otero wanted to know if she can have a F/U apt with a provider and if she can have some more med's refill since she is completely out of medicine . Patient haven't been to [place] [address] since 11/2018 .      Thank you

## 2019-09-25 NOTE — TELEPHONE ENCOUNTER
I prescribed #90 of her medications in 11/2018. This means that she has been out of medications since 2/2019, i.e. For the last 7 months. I recommend offering first available at [facility]. 2 weeks prior to that appointment I will have her return for FASTING labs which have been ordered. Diagnoses and all orders for this visit:    1. Type 2 diabetes mellitus with hyperglycemia, without long-term current use of insulin (HCC)  -     HEMOGLOBIN A1C WITH EAG; Future  -     METABOLIC PANEL, COMPREHENSIVE; Future  -     HCG QL SERUM; Future  -     MICROALBUMIN, UR, RAND W/ MICROALB/CREAT RATIO; Future  -     LIPID PANEL;  Future

## 2019-10-03 NOTE — TELEPHONE ENCOUNTER
Routing to call back reg to have appointment scheduled for [facility] with Lan Horn.  Tereza Au RN

## 2019-10-05 ENCOUNTER — LAB ONLY (OUTPATIENT)
Dept: FAMILY MEDICINE CLINIC | Age: 29
End: 2019-10-05

## 2019-10-05 DIAGNOSIS — E11.65 TYPE 2 DIABETES MELLITUS WITH HYPERGLYCEMIA, WITHOUT LONG-TERM CURRENT USE OF INSULIN (HCC): ICD-10-CM

## 2019-10-05 NOTE — PROGRESS NOTES
Patient came in for a lab only visit per Mildred Kong. Lipid, HCG, CMP, and A1C was drawn from the L-AC without complications. A urin was obtained for a Microalbumin. Results pending.

## 2019-11-08 ENCOUNTER — OFFICE VISIT (OUTPATIENT)
Dept: FAMILY MEDICINE CLINIC | Age: 29
End: 2019-11-08

## 2019-11-08 ENCOUNTER — HOSPITAL ENCOUNTER (OUTPATIENT)
Dept: LAB | Age: 29
Discharge: HOME | End: 2019-11-08

## 2019-11-08 VITALS
BODY MASS INDEX: 34.47 KG/M2 | HEART RATE: 76 BPM | TEMPERATURE: 98.6 F | HEIGHT: 59 IN | DIASTOLIC BLOOD PRESSURE: 69 MMHG | SYSTOLIC BLOOD PRESSURE: 138 MMHG | WEIGHT: 171 LBS

## 2019-11-08 DIAGNOSIS — E11.65 TYPE 2 DIABETES MELLITUS WITH HYPERGLYCEMIA, UNSPECIFIED WHETHER LONG TERM INSULIN USE (HCC): Primary | ICD-10-CM

## 2019-11-08 DIAGNOSIS — Z23 ENCOUNTER FOR IMMUNIZATION: ICD-10-CM

## 2019-11-08 LAB
ALBUMIN SERPL-MCNC: 3.8 G/DL (ref 3.5–5)
ALBUMIN/GLOB SERPL: 1 {RATIO} (ref 1.1–2.2)
ALP SERPL-CCNC: 68 U/L (ref 45–117)
ALT SERPL-CCNC: 22 U/L (ref 12–78)
ANION GAP SERPL CALC-SCNC: 5 MMOL/L (ref 5–15)
AST SERPL-CCNC: 14 U/L (ref 15–37)
BASOPHILS # BLD: 0 K/UL (ref 0–0.1)
BASOPHILS NFR BLD: 1 % (ref 0–1)
BILIRUB SERPL-MCNC: 0.3 MG/DL (ref 0.2–1)
BUN SERPL-MCNC: 9 MG/DL (ref 6–20)
BUN/CREAT SERPL: 18 (ref 12–20)
CALCIUM SERPL-MCNC: 9.1 MG/DL (ref 8.5–10.1)
CHLORIDE SERPL-SCNC: 103 MMOL/L (ref 97–108)
CO2 SERPL-SCNC: 29 MMOL/L (ref 21–32)
CREAT SERPL-MCNC: 0.51 MG/DL (ref 0.55–1.02)
CREAT UR-MCNC: 95.8 MG/DL
DIFFERENTIAL METHOD BLD: NORMAL
EOSINOPHIL # BLD: 0.1 K/UL (ref 0–0.4)
EOSINOPHIL NFR BLD: 1 % (ref 0–7)
ERYTHROCYTE [DISTWIDTH] IN BLOOD BY AUTOMATED COUNT: 11.9 % (ref 11.5–14.5)
EST. AVERAGE GLUCOSE BLD GHB EST-MCNC: 151 MG/DL
GLOBULIN SER CALC-MCNC: 3.7 G/DL (ref 2–4)
GLUCOSE POC: NORMAL MG/DL
GLUCOSE SERPL-MCNC: 169 MG/DL (ref 65–100)
HBA1C MFR BLD: 6.9 % (ref 4.2–6.3)
HCT VFR BLD AUTO: 37.2 % (ref 35–47)
HGB BLD-MCNC: 12.4 G/DL (ref 11.5–16)
IMM GRANULOCYTES # BLD AUTO: 0 K/UL (ref 0–0.04)
IMM GRANULOCYTES NFR BLD AUTO: 0 % (ref 0–0.5)
LYMPHOCYTES # BLD: 3 K/UL (ref 0.8–3.5)
LYMPHOCYTES NFR BLD: 35 % (ref 12–49)
MCH RBC QN AUTO: 29 PG (ref 26–34)
MCHC RBC AUTO-ENTMCNC: 33.3 G/DL (ref 30–36.5)
MCV RBC AUTO: 86.9 FL (ref 80–99)
MICROALBUMIN UR-MCNC: 0.8 MG/DL
MICROALBUMIN/CREAT UR-RTO: 8 MG/G (ref 0–30)
MONOCYTES # BLD: 0.4 K/UL (ref 0–1)
MONOCYTES NFR BLD: 5 % (ref 5–13)
NEUTS SEG # BLD: 4.9 K/UL (ref 1.8–8)
NEUTS SEG NFR BLD: 58 % (ref 32–75)
NRBC # BLD: 0 K/UL (ref 0–0.01)
NRBC BLD-RTO: 0 PER 100 WBC
PLATELET # BLD AUTO: 272 K/UL (ref 150–400)
PMV BLD AUTO: 9.6 FL (ref 8.9–12.9)
POTASSIUM SERPL-SCNC: 4.7 MMOL/L (ref 3.5–5.1)
PROT SERPL-MCNC: 7.5 G/DL (ref 6.4–8.2)
RBC # BLD AUTO: 4.28 M/UL (ref 3.8–5.2)
SODIUM SERPL-SCNC: 137 MMOL/L (ref 136–145)
TSH SERPL DL<=0.05 MIU/L-ACNC: 1.79 UIU/ML (ref 0.36–3.74)
WBC # BLD AUTO: 8.4 K/UL (ref 3.6–11)

## 2019-11-08 PROCEDURE — 85025 COMPLETE CBC W/AUTO DIFF WBC: CPT

## 2019-11-08 PROCEDURE — 83036 HEMOGLOBIN GLYCOSYLATED A1C: CPT

## 2019-11-08 PROCEDURE — 84443 ASSAY THYROID STIM HORMONE: CPT

## 2019-11-08 PROCEDURE — 80053 COMPREHEN METABOLIC PANEL: CPT

## 2019-11-08 PROCEDURE — 82043 UR ALBUMIN QUANTITATIVE: CPT

## 2019-11-08 RX ORDER — METFORMIN HYDROCHLORIDE 500 MG/1
TABLET, EXTENDED RELEASE ORAL
Qty: 90 TAB | Refills: 1 | Status: SHIPPED | OUTPATIENT
Start: 2019-11-08 | End: 2020-02-14 | Stop reason: SDUPTHER

## 2019-11-08 NOTE — PROGRESS NOTES
Assessment/Plan:       ICD-10-CM ICD-9-CM    1. Type 2 diabetes mellitus with hyperglycemia, unspecified whether long term insulin use (HCC) E11.65 250.00 AMB POC GLUCOSE BLOOD, BY GLUCOSE MONITORING DEVICE      HEMOGLOBIN A1C WITH EAG      METABOLIC PANEL, COMPREHENSIVE      MICROALBUMIN, UR, RAND W/ MICROALB/CREAT RATIO      TSH 3RD GENERATION      CBC WITH AUTOMATED DIFF      metFORMIN ER (GLUCOPHAGE XR) 500 mg tablet   2. Encounter for immunization Z23 V03.89 INFLUENZA VIRUS VAC QUAD,SPLIT,PRESV FREE SYRINGE IM     Follow-up and Dispositions    · Return in about 3 months (around 2/8/2020) for return 2 weeks before for fasting labs. A1c, lipid panel will be included in follow up labs. Follow up appointment:   Follow up for labs: A1c and fasting lipid panel next time; labs also done today  Changes made today: started metformin 500mg ER. [address], [address]  [facility] [place] [address]  Phone: [phone] Fax: [phone]      [facility]  Subjective:     Chief Complaint   Patient presents with    Diabetes     f/u, med refill    Immunization/Injection    See Nones is a 30 y.o. OTHER female who speaks Chadian.  used? yes   HPI: Last office visit was 11/12/2018. She was 10 weeks pregnant at that time. Used insulin during the pregnancy? Yes, NPH and Regular. Her baby is David Khalil, age 10 months; also Isra Bird, age 11 years. She has not been on medication for diabetes for 5 months. Has taken metformin 500mg once in a while from her mother-in-law. Has not checked her glucose in a while. Ate at 8 am.  Coffee and ham sandwich with kechup; she puts sugar in the coffee and 1% milk. She doesn't like the artificial sugar.     Results for orders placed or performed in visit on 11/08/19   AMB POC GLUCOSE BLOOD, BY GLUCOSE MONITORING DEVICE   Result Value Ref Range    Glucose POC nf 228 mg/dL     Wt Readings from Last 2 Encounters:   11/08/19 171 lb (77.6 kg)   11/12/18 152 lb 12.8 oz (69.3 kg)   Weight gain noted. Hemoglobin A1c   Date Value Ref Range Status   12/20/2017 7.4 (H) 4.2 - 6.3 % Final   07/19/2017 6.8 (H) 4.2 - 6.3 % Final     Diabetes   Brought in medications? no  Last took medications: took metformin on Sunday, took with dinner  Last ate: today  Lifestyle  Working: takes care of her kids  Physical Activity? no  Eating healthy? Not yet, will try black coffee no sugar  Measuring glucoses? no     Medications:    Current Outpatient Medications   Medication Sig Dispense    metFORMIN ER (GLUCOPHAGE XR) 500 mg tablet 1 po daily with dinner; bernard 1 tab con la sean 90 Tab     No current facility-administered medications for this visit. Office Visit on 11/08/2019   Component Date Value Ref Range Status    Glucose POC 11/08/2019 nf 228  mg/dL Final     Social History: She reports that she has never smoked. She has never used smokeless tobacco. She reports that she drinks alcohol. She reports that she does not use drugs. Family History: Her family history includes Diabetes in her father. ROS:     No increased frequency of urination,   no increased thirst;   no chest pain, dyspnea or TIA's;   no numbness, tingling or pain in extremities;   no reports of hypoglycemia. Social History: She reports that she has never smoked. She has never used smokeless tobacco. She reports that she drinks alcohol. She reports that she does not use drugs. Family History: Her family history includes Diabetes in her father. Objective:     Vitals:    11/08/19 0851   BP: 138/69   Pulse: 76   Temp: 98.6 °F (37 °C)   TempSrc: Oral   Weight: 171 lb (77.6 kg)   Height: 4' 11.25\" (1.505 m)    Patient's last menstrual period was 10/22/2019.     Results for orders placed or performed in visit on 11/08/19   AMB POC GLUCOSE BLOOD, BY GLUCOSE MONITORING DEVICE   Result Value Ref Range    Glucose POC nf 228 mg/dL      Wt Readings from Last 2 Encounters:   11/08/19 171 lb (77.6 kg)   11/12/18 152 lb 12.8 oz (69.3 kg)    Weight increased; Hemoglobin A1c   Date Value Ref Range Status   12/20/2017 7.4 (H) 4.2 - 6.3 % Final   07/19/2017 6.8 (H) 4.2 - 6.3 % Final    A1C decreased; needs repeating today  Physical Exam:  Constitutional: She appears well-developed. Eyes: EOM are normal. Pupils are equal, round, and reactive to light. Neck: Neck supple. No thyromegaly present. Cardiovascular: Normal rate, regular rhythm, normal heart sounds and intact distal pulses. No murmur heard. Pulmonary/Chest: Effort normal and breath sounds normal.   Musculoskeletal: She exhibits no edema. No ulcers of the lower extremities. Assessment/Plan:   Diagnoses and all orders for this visit:    1. Type 2 diabetes mellitus with hyperglycemia, unspecified whether long term insulin use (HCC)  -     AMB POC GLUCOSE BLOOD, BY GLUCOSE MONITORING DEVICE  -     HEMOGLOBIN A1C WITH EAG; Future  -     METABOLIC PANEL, COMPREHENSIVE; Future  -     MICROALBUMIN, UR, RAND W/ MICROALB/CREAT RATIO; Future  -     TSH 3RD GENERATION; Future  -     CBC WITH AUTOMATED DIFF; Future  -     metFORMIN ER (GLUCOPHAGE XR) 500 mg tablet; 1 po daily with dinner; bernard 1 tab con la sean    2. Encounter for immunization  -     INFLUENZA VIRUS VAC QUAD,SPLIT,PRESV FREE SYRINGE IM      Diabetes Mellitus type 2, under Poor control. Blood pressure under Good control. Greater than 50% of this 25 minute visit was spent in face-to-face counseling/coordination of care regarding diabetes management. Follow-up and Dispositions    · Return in about 3 months (around 2/8/2020) for return 2 weeks before for fasting labs. Restart metformin. She had an IUD put in and it fell out; had another one and that also fell out. Started taking Sprintec 28 for 3 days and she felt very bad. Bad mood, headache, pain in the breasts. Itching in the breasts. Baby is not breast fed. Using condoms. Yazmin Fernandez DNP, FNP-BC, BC-ADM  Raul Strange expressed understanding of this plan.

## 2019-11-08 NOTE — PROGRESS NOTES
Reviewed AVS, prescription and pharmacy location with patient. AVS printed and given. Patient aware that provider would like to follow up about today's visit in 3 months with an appt. And 2 weeks prior for labs only. This has been fully explained to the patient, who indicates understanding and agrees with plan. No further questions at this time. Floria Dancer, RN     Immunization given per provider order following policy and protocol. Please see scanned consent form. VIS given. No contraindications to vaccines. Documented in [facility]. Instructions for adverse reactions discussed. Explained that if symptoms (rash, hives SOB, temperature higher of 102'f) to go to the nearest ER. Patient instructed to wait in the clinic for 15 minutes  to observe for any signs of immediate reaction. Told to tell a nurse immediately if reaction occur; if no change and felt well, it was OK to leave after 15 min. Patient expressed understanding. No adverse reaction noted at time of discharge from vaccine area.

## 2019-11-08 NOTE — PROGRESS NOTES
Coordination of Care  1. Have you been to the ER, urgent care clinic since your last visit? Hospitalized since your last visit? No    2. Have you seen or consulted any other health care providers outside of the [address] [place] [address] since your last visit? Include any pap smears or colon screening. No    Does the patient need refills? YES    Learning Assessment Complete?  yes  Depression Screening complete in the past 12 months? yes  Results for orders placed or performed in visit on 11/08/19   AMB POC GLUCOSE BLOOD, BY GLUCOSE MONITORING DEVICE   Result Value Ref Range    Glucose POC nf 228 mg/dL

## 2020-01-24 ENCOUNTER — HOSPITAL ENCOUNTER (OUTPATIENT)
Dept: LAB | Age: 30
Discharge: HOME | End: 2020-01-24

## 2020-01-24 ENCOUNTER — LAB ONLY (OUTPATIENT)
Dept: FAMILY MEDICINE CLINIC | Age: 30
End: 2020-01-24

## 2020-01-24 DIAGNOSIS — E11.65 TYPE 2 DIABETES MELLITUS WITH HYPERGLYCEMIA, UNSPECIFIED WHETHER LONG TERM INSULIN USE (HCC): ICD-10-CM

## 2020-01-24 LAB
ALBUMIN SERPL-MCNC: 4.2 G/DL (ref 3.5–5)
ALBUMIN/GLOB SERPL: 1.1 {RATIO} (ref 1.1–2.2)
ALP SERPL-CCNC: 72 U/L (ref 45–117)
ALT SERPL-CCNC: 22 U/L (ref 12–78)
ANION GAP SERPL CALC-SCNC: 5 MMOL/L (ref 5–15)
AST SERPL-CCNC: 13 U/L (ref 15–37)
BASOPHILS # BLD: 0 K/UL (ref 0–0.1)
BASOPHILS NFR BLD: 0 % (ref 0–1)
BILIRUB SERPL-MCNC: 0.4 MG/DL (ref 0.2–1)
BUN SERPL-MCNC: 10 MG/DL (ref 6–20)
BUN/CREAT SERPL: 18 (ref 12–20)
CALCIUM SERPL-MCNC: 9.4 MG/DL (ref 8.5–10.1)
CHLORIDE SERPL-SCNC: 105 MMOL/L (ref 97–108)
CO2 SERPL-SCNC: 26 MMOL/L (ref 21–32)
CREAT SERPL-MCNC: 0.57 MG/DL (ref 0.55–1.02)
CREAT UR-MCNC: 233 MG/DL
DIFFERENTIAL METHOD BLD: NORMAL
EOSINOPHIL # BLD: 0.2 K/UL (ref 0–0.4)
EOSINOPHIL NFR BLD: 2 % (ref 0–7)
ERYTHROCYTE [DISTWIDTH] IN BLOOD BY AUTOMATED COUNT: 12.5 % (ref 11.5–14.5)
EST. AVERAGE GLUCOSE BLD GHB EST-MCNC: 143 MG/DL
GLOBULIN SER CALC-MCNC: 3.8 G/DL (ref 2–4)
GLUCOSE SERPL-MCNC: 166 MG/DL (ref 65–100)
HBA1C MFR BLD: 6.6 % (ref 4–5.6)
HCT VFR BLD AUTO: 39.5 % (ref 35–47)
HGB BLD-MCNC: 13 G/DL (ref 11.5–16)
IMM GRANULOCYTES # BLD AUTO: 0 K/UL (ref 0–0.04)
IMM GRANULOCYTES NFR BLD AUTO: 0 % (ref 0–0.5)
LYMPHOCYTES # BLD: 3.3 K/UL (ref 0.8–3.5)
LYMPHOCYTES NFR BLD: 35 % (ref 12–49)
MCH RBC QN AUTO: 29.1 PG (ref 26–34)
MCHC RBC AUTO-ENTMCNC: 32.9 G/DL (ref 30–36.5)
MCV RBC AUTO: 88.4 FL (ref 80–99)
MICROALBUMIN UR-MCNC: 2.09 MG/DL
MICROALBUMIN/CREAT UR-RTO: 9 MG/G (ref 0–30)
MONOCYTES # BLD: 0.5 K/UL (ref 0–1)
MONOCYTES NFR BLD: 5 % (ref 5–13)
NEUTS SEG # BLD: 5.7 K/UL (ref 1.8–8)
NEUTS SEG NFR BLD: 58 % (ref 32–75)
NRBC # BLD: 0 K/UL (ref 0–0.01)
NRBC BLD-RTO: 0 PER 100 WBC
PLATELET # BLD AUTO: 305 K/UL (ref 150–400)
PMV BLD AUTO: 9.6 FL (ref 8.9–12.9)
POTASSIUM SERPL-SCNC: 4.8 MMOL/L (ref 3.5–5.1)
PROT SERPL-MCNC: 8 G/DL (ref 6.4–8.2)
RBC # BLD AUTO: 4.47 M/UL (ref 3.8–5.2)
SODIUM SERPL-SCNC: 136 MMOL/L (ref 136–145)
TSH SERPL DL<=0.05 MIU/L-ACNC: 3.73 UIU/ML (ref 0.36–3.74)
WBC # BLD AUTO: 9.7 K/UL (ref 3.6–11)

## 2020-01-24 PROCEDURE — 82043 UR ALBUMIN QUANTITATIVE: CPT

## 2020-01-24 PROCEDURE — 85025 COMPLETE CBC W/AUTO DIFF WBC: CPT

## 2020-01-24 PROCEDURE — 84443 ASSAY THYROID STIM HORMONE: CPT

## 2020-01-24 PROCEDURE — 80053 COMPREHEN METABOLIC PANEL: CPT

## 2020-01-24 PROCEDURE — 36415 COLL VENOUS BLD VENIPUNCTURE: CPT

## 2020-01-24 PROCEDURE — 83036 HEMOGLOBIN GLYCOSYLATED A1C: CPT

## 2020-01-24 NOTE — PROGRESS NOTES
Pt was here today for labs only. CBC, A1C, TSH, CMP, were drawn on [place] w/no comcplications. Microalbumin was also collected.  Joel Morin

## 2020-02-11 DIAGNOSIS — E11.65 TYPE 2 DIABETES MELLITUS WITH HYPERGLYCEMIA, UNSPECIFIED WHETHER LONG TERM INSULIN USE (HCC): Primary | ICD-10-CM

## 2020-02-11 NOTE — PROGRESS NOTES
Assessment/Plan:       ICD-10-CM ICD-9-CM    1. Controlled type 2 diabetes mellitus without complication, without long-term current use of insulin (HCC) E11.9 250.00 AMB POC GLUCOSE BLOOD, BY GLUCOSE MONITORING DEVICE      metFORMIN ER (GLUCOPHAGE XR) 500 mg tablet      REFERRAL TO OPHTHALMOLOGY       DIABETES FOOT EXAM       DIABETES EYE EXAM      LIPID PANEL      HEMOGLOBIN A1C WITH EAG   -fasting lipid panel and A1c 2 wks before. These have been ordered as future orders in today's visit. Follow up appointment: 3 months  Follow up for labs: 2 weeks before  Changes/recommendations made today: try to eliminate regular sugar; consider increasing physical activity. Current Outpatient Medications   Medication Sig Dispense    metFORMIN ER (GLUCOPHAGE XR) 500 mg tablet 1 po daily with dinner; bernard 1 tab con la sean 90 Tab       [facility]  Subjective:     Chief Complaint   Patient presents with    Diabetes     f/u, med refill, room [facility] Zeny is a 30 y.o. OTHER female who speaks Icelandic.  used? yes   HPI:    she notes watering eyes after being in front of a computer for 30 minutes. Some burning. Needs visual acuity as in from [facility] and AN  ROS:     No hypoglycemia; No pain in extremities; No numbness or tingling in extremities; No increased frequency of urination,   No blurry vision,  No weight loss,  No chest pain, dyspnea or TIA's;   Not drinking more water  Working: yes   Exercise: no  Last ate: yesterday; Tortillas (2) with beans at 10 pm, drank coffee with 1/2 spoon regular sugar and milk, 1%.   Food eaten:   Eating lower carb?  no  Results for orders placed or performed in visit on 02/14/20   AMB POC GLUCOSE BLOOD, BY GLUCOSE MONITORING DEVICE   Result Value Ref Range    Glucose POC fasting 147 mg/dL     Measuring glucoses? no   Hemoglobin A1c   Date Value Ref Range Status   01/24/2020 6.6 (H) 4.0 - 5.6 % Final   11/08/2019 6.9 (H) 4.2 - 6.3 % Final    A1c decreased  Social History: She reports that she has never smoked. She has never used smokeless tobacco. She reports current alcohol use. She reports that she does not use drugs. Family History: Her family history includes Diabetes in her father. Objective:     Vitals:    02/14/20 0905   BP: 123/74   Pulse: 74   Temp: 98.5 °F (36.9 °C)   TempSrc: Temporal   SpO2: 98%   Weight: 173 lb (78.5 kg)   Height: 4' 11.25\" (1.505 m)    Patient's last menstrual period was 01/31/2020. Results for orders placed or performed in visit on 02/14/20   AMB POC GLUCOSE BLOOD, BY GLUCOSE MONITORING DEVICE   Result Value Ref Range    Glucose POC fasting 147 mg/dL     Wt Readings from Last 2 Encounters:   02/14/20 173 lb (78.5 kg)   11/08/19 171 lb (77.6 kg)    Weight increased     Physical Exam:  Constitutional: She appears well-developed. Eyes: EOM are normal. Pupils are equal, round, and reactive to light. Neck: Neck supple. No thyromegaly present. Cardiovascular: Normal rate, regular rhythm, normal heart sounds and intact distal pulses. No murmur heard. Pulmonary/Chest: Effort normal and breath sounds normal.   Musculoskeletal: She exhibits no edema. No ulcers of the lower extremities.          Diabetic foot exam performed by Anai Everett NP     Measurement  Response Comments   Monofilament  R - normal sensation with micro filament  L - normal sensation with micro filament    Pulse DP R - 2+ (normal)  L - 2+ (normal)    Pulse TP R - 2+ (normal)  L - 2+ (normal)    Structural deformity R - None  L - None    Skin Integrity / Deformity R - None  L - None              Lab Results   Component Value Date/Time    Cholesterol, total 198 07/11/2017 09:03 AM    HDL Cholesterol 31 07/11/2017 09:03 AM    LDL, calculated 96 07/11/2017 09:03 AM    Triglyceride 355 (H) 07/11/2017 09:03 AM    CHOL/HDL Ratio 6.4 (H) 07/11/2017 09:03 AM     Lab Results   Component Value Date/Time    ALT (SGPT) 22 01/24/2020 08:50 AM    AST (SGOT) 13 (L) 01/24/2020 08:50 AM    Alk. phosphatase 72 01/24/2020 08:50 AM    Bilirubin, total 0.4 01/24/2020 08:50 AM     Lab Results   Component Value Date/Time    Microalbumin/Creat ratio (mg/g creat) 9 01/24/2020 08:50 AM    Microalbumin,urine random 2.09 01/24/2020 08:50 AM    Creatinine 0.57 01/24/2020 08:50 AM     Lab Results   Component Value Date/Time    GFR est AA >60 01/24/2020 08:50 AM    GFR est non-AA >60 01/24/2020 08:50 AM     Assessment/Plan:   Diagnoses and all orders for this visit:    1. Controlled type 2 diabetes mellitus without complication, without long-term current use of insulin (HCC)  -     AMB POC GLUCOSE BLOOD, BY GLUCOSE MONITORING DEVICE  -     metFORMIN ER (GLUCOPHAGE XR) 500 mg tablet; 1 po daily with dinner; bernard 1 tab con la sean  -     REFERRAL TO OPHTHALMOLOGY  -      DIABETES FOOT EXAM; Future  -      DIABETES EYE EXAM  -     LIPID PANEL; Future  -     HEMOGLOBIN A1C WITH EAG; Future    Normal foot exam.    Diabetes Mellitus type 2, under Good control. Blood pressure under Good control. Greater than 50% of this 25 minute visit was spent in face-to-face counseling/coordination of care regarding diabetes management. Alexander Kwan, PADILLA, FNP-BC, BC-ADM  Attila Escobedo expressed understanding of this plan.

## 2020-02-11 NOTE — PROGRESS NOTES
Patient recently returned fasting for labs which did not include lipid panel. Lipid panel ordered and can be drawn on 2/14/2020. Note if patient is fasting or not fasting. Last drawn 2017. Diagnoses and all orders for this visit:    1. Type 2 diabetes mellitus with hyperglycemia, unspecified whether long term insulin [facility]  -     LIPID PANEL;  Future      Vera Guzman NP

## 2020-02-14 ENCOUNTER — OFFICE VISIT (OUTPATIENT)
Dept: FAMILY MEDICINE CLINIC | Age: 30
End: 2020-02-14

## 2020-02-14 ENCOUNTER — DOCUMENTATION ONLY (OUTPATIENT)
Dept: FAMILY MEDICINE CLINIC | Age: 30
End: 2020-02-14

## 2020-02-14 VITALS
HEART RATE: 74 BPM | DIASTOLIC BLOOD PRESSURE: 74 MMHG | OXYGEN SATURATION: 98 % | BODY MASS INDEX: 34.88 KG/M2 | TEMPERATURE: 98.5 F | SYSTOLIC BLOOD PRESSURE: 123 MMHG | HEIGHT: 59 IN | WEIGHT: 173 LBS

## 2020-02-14 DIAGNOSIS — E11.9 CONTROLLED TYPE 2 DIABETES MELLITUS WITHOUT COMPLICATION, WITHOUT LONG-TERM CURRENT USE OF INSULIN (HCC): Primary | ICD-10-CM

## 2020-02-14 DIAGNOSIS — Z71.89 COUNSELING AND COORDINATION OF CARE: Primary | ICD-10-CM

## 2020-02-14 LAB — GLUCOSE POC: NORMAL MG/DL

## 2020-02-14 RX ORDER — METFORMIN HYDROCHLORIDE 500 MG/1
TABLET, EXTENDED RELEASE ORAL
Qty: 90 TAB | Refills: 1 | Status: SHIPPED | OUTPATIENT
Start: 2020-02-14 | End: 2020-05-14 | Stop reason: SDUPTHER

## 2020-02-14 NOTE — PROGRESS NOTES
Reviewed AVS, prescription and pharmacy location with patient. AVS printed and given. Patient aware that provider would like to follow up about today's visit in 3 months with an appointment and 2 weeks prior for fasting labs. Provider placed [facility] referral for ophthalmology for patient, patient was able to meet with CVAN OW to go over AN program. This has been fully explained to the patient, who indicates understanding and agrees with plan. No further questions at this time.  Altamese Apley, RN

## 2020-02-14 NOTE — PROGRESS NOTES
Coordination of Care  1. Have you been to the ER, urgent care clinic since your last visit? Hospitalized since your last visit? No    2. Have you seen or consulted any other health care providers outside of the [address] [place] [address] since your last visit? Include any pap smears or colon screening. No    Does the patient need refills? YES    Learning Assessment Complete?  yes  Depression Screening complete in the past 12 months? yes  Results for orders placed or performed in visit on 02/14/20   AMB POC GLUCOSE BLOOD, BY GLUCOSE MONITORING DEVICE   Result Value Ref Range    Glucose POC fasting 147 mg/dL

## 2020-02-17 ENCOUNTER — DOCUMENTATION ONLY (OUTPATIENT)
Dept: FAMILY MEDICINE CLINIC | Age: 30
End: 2020-02-17

## 2020-02-17 NOTE — PROGRESS NOTES
Pt brought boyfriend to [facility] site on 12/14/2020 and OW had his Support Letter signed and notarized. Appl complete. Appl submitted to Sydnee LIANG on 2/17/2020.

## 2020-02-19 ENCOUNTER — DOCUMENTATION ONLY (OUTPATIENT)
Dept: FAMILY MEDICINE CLINIC | Age: 30
End: 2020-02-19

## 2020-02-19 NOTE — PROGRESS NOTES
Received AN appl back from Sydnee. Pt needs to bring 200 and boyfriend's POI. OW called pt and let her know what she needs to bring. Pt said she'd bring it as soon as she can.

## 2020-02-26 ENCOUNTER — DOCUMENTATION ONLY (OUTPATIENT)
Dept: FAMILY MEDICINE CLINIC | Age: 30
End: 2020-02-26

## 2020-05-14 ENCOUNTER — OFFICE VISIT (OUTPATIENT)
Dept: FAMILY MEDICINE CLINIC | Age: 30
End: 2020-05-14

## 2020-05-14 VITALS — BODY MASS INDEX: 34.88 KG/M2 | HEIGHT: 59 IN | WEIGHT: 173 LBS

## 2020-05-14 DIAGNOSIS — E11.9 CONTROLLED TYPE 2 DIABETES MELLITUS WITHOUT COMPLICATION, WITHOUT LONG-TERM CURRENT USE OF INSULIN (HCC): Primary | ICD-10-CM

## 2020-05-14 PROBLEM — Z3A.10 10 WEEKS GESTATION OF PREGNANCY: Status: RESOLVED | Noted: 2018-11-12 | Resolved: 2020-05-14

## 2020-05-14 RX ORDER — METFORMIN HYDROCHLORIDE 500 MG/1
TABLET, EXTENDED RELEASE ORAL
Qty: 90 TAB | Refills: 1 | Status: SHIPPED | OUTPATIENT
Start: 2020-05-14 | End: 2021-10-21 | Stop reason: SDUPTHER

## 2020-05-14 NOTE — PROGRESS NOTES
Sage Calhoun is a 26 y.o. female evaluated via telephone at [phone] on 5/14/2020. Patient identification verified with 2 identifiers. Consent: She and/or health care decision maker has provided verbal consent to proceed: Yes Pursuant to the emergency declaration under the [facility], [address] [place] [address] and the [employer] Act, this Virtual Telephone Visit was conducted to reduce the patient's risk of exposure to COVID-19. I affirm this is a Patient Initiated Episode with a Patient who has not had a related appointment within my department in the past 7 days or scheduled within the next 24 hours. : [place] Vieyra. Documentation: I communicated the following details with the patient and/or health care decision maker, including any medical advice provided:   Assessment/Plan:       ICD-10-CM ICD-9-CM    1. Controlled type 2 diabetes mellitus without complication, without long-term current use of insulin ([facility]) E11.9 250.00       Follow up appointment: 6 months (has 90 days medication and a refill)  Follow up for labs: 5 months, NON-urgent, fasting; self-tickler placed to remind me to order labs around 4 months  Changes/recommendations made today:  No orders of the defined types were placed in this encounter. Medications refilled. Lipids not addressed because not an urgent lab but will be addressed when nonurgent labs are able to be done. At that time, A1c, fasting lipids, may need others. [facility]-MAIN OFFICE  Subjective:     Chief Complaint   Patient presents with    Diabetes     f/u    Sage Calhoun is a 26 y.o. OTHER female. HPI:   ROS: denies hypoglycemia, pain in extremities, numbness or tingling in extremities, increased frequency of urination, blurry vision, weight change, chest pain, dyspnea or TIA's; polydipsia. Social History: She reports that she has never smoked.  She has never used smokeless tobacco. She reports current alcohol use. She reports that she does not use drugs. Family History: Her family history includes Diabetes in her father. Current Outpatient Medications on File Prior to Visit   Medication Sig Dispense Refill    metFORMIN ER (GLUCOPHAGE XR) 500 mg tablet 1 po daily with dinner; bernard 1 tab con la sean 90 Tab 1     No current facility-administered medications on file prior to visit. Objective:     Vitals:    05/14/20 0914   Weight: 173 lb (78.5 kg)   Height: 4' 11.25\" (1.505 m)    Patient's last menstrual period was 05/10/2020 (approximate). Wt Readings from Last 2 Encounters:   05/14/20 173 lb (78.5 kg)   02/14/20 173 lb (78.5 kg)      Hemoglobin A1c   Date Value Ref Range Status   01/24/2020 6.6 (H) 4.0 - 5.6 % Final   11/08/2019 6.9 (H) 4.2 - 6.3 % Final    A1c decreased  Lab Results   Component Value Date/Time    Cholesterol, total 198 07/11/2017 09:03 AM    HDL Cholesterol 31 07/11/2017 09:03 AM    LDL, calculated 96 07/11/2017 09:03 AM    Triglyceride 355 (H) 07/11/2017 09:03 AM    CHOL/HDL Ratio 6.4 (H) 07/11/2017 09:03 AM     Lab Results   Component Value Date/Time    ALT (SGPT) 22 01/24/2020 08:50 AM    AST (SGOT) 13 (L) 01/24/2020 08:50 AM    Alk. phosphatase 72 01/24/2020 08:50 AM    Bilirubin, total 0.4 01/24/2020 08:50 AM     Lab Results   Component Value Date/Time    Microalbumin/Creat ratio (mg/g creat) 9 01/24/2020 08:50 AM    Microalbumin,urine random 2.09 01/24/2020 08:50 AM    Creatinine 0.57 01/24/2020 08:50 AM     Lab Results   Component Value Date/Time    GFR est AA >60 01/24/2020 08:50 AM    GFR est non-AA >60 01/24/2020 08:50 AM     Physical Exam: Not performed due to telephone call visit. Assessment/Plan:   Diagnoses and all orders for this visit:    1.  Controlled type 2 diabetes mellitus without complication, without long-term current use of insulin ([address]    Time spent in visit: 5 to 10 minutes  Genie Lomax, PADILLA, FNP-BC, BC-ADM  Quang Carreon Brittany Cook expressed understanding of this plan.

## 2020-05-14 NOTE — PROGRESS NOTES
Coordination of Care  1. Have you been to the ER, urgent care clinic since your last visit? Hospitalized since your last visit? No    2. Have you seen or consulted any other health care providers outside of the [address] [place] [address] since your last visit? Include any pap smears or colon screening. No    Does the patient need refills? YES    Learning Assessment Complete?  yes  Depression Screening complete in the past 12 months? yes

## 2021-10-21 ENCOUNTER — OFFICE VISIT (OUTPATIENT)
Dept: FAMILY MEDICINE CLINIC | Age: 31
End: 2021-10-21

## 2021-10-21 ENCOUNTER — HOSPITAL ENCOUNTER (OUTPATIENT)
Dept: LAB | Age: 31
Discharge: HOME | End: 2021-10-21

## 2021-10-21 VITALS
OXYGEN SATURATION: 99 % | TEMPERATURE: 97.7 F | WEIGHT: 154 LBS | SYSTOLIC BLOOD PRESSURE: 127 MMHG | BODY MASS INDEX: 30.84 KG/M2 | HEART RATE: 83 BPM | DIASTOLIC BLOOD PRESSURE: 81 MMHG

## 2021-10-21 DIAGNOSIS — E11.65 UNCONTROLLED TYPE 2 DIABETES MELLITUS WITH HYPERGLYCEMIA (HCC): Primary | ICD-10-CM

## 2021-10-21 DIAGNOSIS — E11.65 UNCONTROLLED TYPE 2 DIABETES MELLITUS WITH HYPERGLYCEMIA (HCC): ICD-10-CM

## 2021-10-21 LAB — GLUCOSE POC: NORMAL MG/DL

## 2021-10-21 PROCEDURE — 82962 GLUCOSE BLOOD TEST: CPT | Performed by: NURSE PRACTITIONER

## 2021-10-21 PROCEDURE — 99213 OFFICE O/P EST LOW 20 MIN: CPT | Performed by: NURSE PRACTITIONER

## 2021-10-21 RX ORDER — METFORMIN HYDROCHLORIDE 500 MG/1
TABLET, EXTENDED RELEASE ORAL
Qty: 360 TABLET | Refills: 0 | Status: SHIPPED | OUTPATIENT
Start: 2021-10-21 | End: 2021-10-24 | Stop reason: SDUPTHER

## 2021-10-21 NOTE — PROGRESS NOTES
Maryjane Moreno (: 1990) is a 33 y.o. female, established patient, here for evaluation of the following chief complaint(s):  Diabetes (f/u)       ASSESSMENT/PLAN:  Below is the assessment and plan developed based on review of pertinent history, physical exam, labs, studies, and medications. 1. Uncontrolled type 2 diabetes mellitus with hyperglycemia (HCC)  -     AMB POC GLUCOSE BLOOD, BY GLUCOSE MONITORING DEVICE  -      DIABETES FOOT EXAM  -     LIPID PANEL; Future  -     METABOLIC PANEL, COMPREHENSIVE; Future  -     CBC WITH AUTOMATED DIFF; Future  -     HEMOGLOBIN A1C WITH EAG; Future  -     MICROALBUMIN, UR, RAND W/ MICROALB/CREAT RATIO; Future  -     TSH 3RD GENERATION; Future  -     metFORMIN ER (GLUCOPHAGE XR) 500 mg tablet; 2 po daily with dinner; bernard 2 por boca diario con la sean, Normal, Disp-360 Tablet, R-0Please honor cash price of $10.00 for #360  May continue Tylenol for back pain. Return for 4 wks LK F2F diabetes. SUBJECTIVE/OBJECTIVE:  No numbness or tingling of the extremities    Diabetes  This is a chronic problem. Pertinent negatives include no chest pain and no shortness of breath. Ran out of metformin 2 weeks ago. Has had DM since . She was very thirsty, dizzy sometimes.   Family History   Problem Relation Age of Onset    Diabetes Father     Kidney Disease Sister 40        Dialysis     Past Medical History:   Diagnosis Date    Gestational diabetes mellitus     Type 2 diabetes mellitus ([facility] 2016     Results for orders placed or performed in visit on 10/21/21   AMB POC GLUCOSE BLOOD, BY GLUCOSE MONITORING DEVICE   Result Value Ref Range    Glucose  nf MG/DL     Latest Lab Result Choices:   Lab Results   Component Value Date/Time    Hemoglobin A1c 6.6 (H) 2020 08:50 AM    Hemoglobin A1c 6.9 (H) 2019 10:12 AM    Hemoglobin A1c 7.4 (H) 2017 02:32 PM    Glucose 166 (H) 2020 08:50 AM    Glucose  nf 10/21/2021 01:16 PM Microalbumin/Creat ratio (mg/g creat) 9 01/24/2020 08:50 AM    Microalbumin,urine random 2.09 01/24/2020 08:50 AM    LDL, calculated 96 07/11/2017 09:03 AM    Creatinine 0.57 01/24/2020 08:50 AM          Review of Systems   Respiratory: Negative for shortness of breath. Cardiovascular: Negative for chest pain. Gastrointestinal: Negative for constipation, diarrhea, nausea and vomiting. Endocrine: Negative for polydipsia. Genitourinary: Negative for frequency. Musculoskeletal: Positive for back pain. Worse with movement   Neurological: Negative for numbness. Vitals:    10/21/21 1313   BP: 127/81   Pulse: 83   Temp: 97.7 °F (36.5 °C)   TempSrc: Temporal   SpO2: 99%   Weight: 154 lb (69.9 kg)       Physical Exam  Constitutional:       Appearance: She is well-developed. Eyes:      Pupils: Pupils are equal, round, and reactive to light. Neck:      Thyroid: No thyromegaly. Cardiovascular:      Rate and Rhythm: Normal rate and regular rhythm. Heart sounds: Normal heart sounds. No murmur heard. Pulmonary:      Effort: Pulmonary effort is normal.      Breath sounds: Normal breath sounds. Musculoskeletal:      Cervical back: Normal and neck supple. Thoracic back: Normal.      Lumbar back: Tenderness present. Diabetes foot exam performed by Juliann Llanos NP     Measurement  Right Left   Monofilament  R - normal sensation with micro filament    L - normal sensation with micro filament    Pulse DP R - 2+ (normal)   L - 2+ (normal)   Pulse TP R - 2+ (normal)   L - 2+ (normal)   Structural deformity R - None   L - None   Skin Integrity / Deformity R - None    L - None         An electronic signature was used to authenticate this note.   -- Juliann Llanos NP

## 2021-10-21 NOTE — PROGRESS NOTES
[address] [facility] [address] [place] [address]   Office [phone], Fax [phone]    Subjective:     Chief Complaint   Patient presents with    Diabetes     f/u       HPI:  Shelagh Moritz is a 33 y.o. female that presents for:    Diabetes f/u:  Diagnosed in 2015. She ran out of Metformin but was taking every night. Last two weeks has been out. Denies SE of Metformin. When she was first diagnosed she was very thirsty and had dizziness occasionally. She denies numbness or tingling in feet, change in vision. Past Medical Hx  I personally reviewed. Past Medical History:   Diagnosis Date    Gestational diabetes mellitus 2015    Type 2 diabetes mellitus ([facility] 2/23/2016        SocHx   I personally reviewed. Social History     Tobacco Use    Smoking status: Never Smoker    Smokeless tobacco: Never Used   Substance Use Topics    Alcohol use: Yes     Alcohol/week: 0.0 standard drinks     Comment: social    Drug use: No        Allergies  I personally reviewed. Allergies   Allergen Reactions    Pork Derived (Porcine) Rash        Medications  I personally reviewed. Current Outpatient Medications on File Prior to Visit   Medication Sig Dispense Refill    [DISCONTINUED] metFORMIN ER (GLUCOPHAGE XR) 500 mg tablet 1 po daily with dinner; bernard 1 tab con la sean 90 Tab 1     No current facility-administered medications on file prior to visit. ROS: See HPI    Objective:   Vitals  I personally reviewed. Visit Vitals  /81 (BP 1 Location: Left upper arm, BP Patient Position: Sitting)   Pulse 83   Temp 97.7 °F (36.5 °C) (Temporal)   Wt 154 lb (69.9 kg)   LMP 09/29/2021   SpO2 99%   BMI 30.84 kg/m²        Physical Exam     General Alert. No distress. Not diaphoretic. No jaundice. No cyanosis. No pallor. HENT Head Normocephalic and atraumatic. Eyes Conjunctivae pink, no discharge. No scleral icterus. EOMI. Cardio Normal rate, regular rhythm. No murmur, gallop, or friction rub.  No chest wall tenderness. Pulmonary Effort normal. Breath sounds normal. No respiratory distress. No wheezes, rales, or rhonchi. No Stridor. Neurological No focal deficits. Skin Skin is warm and dry. No rash noted. No erythema. See Stew Obrien note for diabetic foot exam.   Psychiatric Mood, affect, and judgment normal.        I personally reviewed the following Pertinent Labs/Studies:   - POC glucose     Assessment:       ICD-10-CM ICD-9-CM    1. Uncontrolled type 2 diabetes mellitus with hyperglycemia ([facility])  E11.65 250.02  DIABETES FOOT EXAM      LIPID PANEL      METABOLIC PANEL, COMPREHENSIVE      CBC WITH AUTOMATED DIFF      HEMOGLOBIN A1C WITH EAG      MICROALBUMIN, UR, RAND W/ MICROALB/CREAT RATIO      TSH 3RD GENERATION      metFORMIN ER (GLUCOPHAGE XR) 500 mg tablet   2. Controlled type 2 diabetes mellitus without complication, without long-term current use of insulin ([facility])  E11.9 250.00 AMB POC GLUCOSE BLOOD, BY GLUCOSE MONITORING DEVICE       Plan:   1. Uncontrolled type 2 diabetes mellitus with hyperglycemia ([facility]): POC glucose 271. Will increase Metformin to 1000mg daily. Will check A1C and reassess medication needs. - AMB POC GLUCOSE BLOOD, BY GLUCOSE MONITORING DEVICE  -  DIABETES FOOT EXAM  - LIPID PANEL; Future  - METABOLIC PANEL, COMPREHENSIVE; Future  - CBC WITH AUTOMATED DIFF; Future  - HEMOGLOBIN A1C WITH EAG; Future  - MICROALBUMIN, UR, RAND W/ MICROALB/CREAT RATIO; Future  - TSH 3RD GENERATION; Future  - metFORMIN ER (GLUCOPHAGE XR) 500 mg tablet; 2 po daily with dinner; bernard 2 por boca diario con la sean  Dispense: 360 Tablet; Refill: 0     Follow-up and Dispositions    · Return for 4 wks LK F2F diabetes. Pt was discussed with Savanna Cuevas NP (attending clinician). I have reviewed patient medical and social history and medications. I have reviewed pertinent labs results and other data.  I have discussed the diagnosis with the patient and the intended plan as seen in the above orders. The patient has received an after-visit summary and questions were answered concerning future plans. I have discussed medication side effects and warnings with the patient as well.     Triny Biswas MD  Resident [facility]  10/21/21

## 2021-10-21 NOTE — PROGRESS NOTES
Results for orders placed or performed in visit on 10/21/21   AMB POC GLUCOSE BLOOD, BY GLUCOSE MONITORING DEVICE   Result Value Ref Range    Glucose  nf MG/DL     Coordination of Care  1. Have you been to the ER, urgent care clinic since your last visit? Hospitalized since your last visit? No    2. Have you seen or consulted any other health care providers outside of the [address] [place] [address] since your last visit? Include any pap smears or colon screening. No    Does the patient need refills? N/A    Learning Assessment Complete?  yes

## 2021-10-21 NOTE — PROGRESS NOTES
Oscar Cross seen at d/c, given AVS and reviewed today's visit with patient. Rx of Metformin reviewed with patient. Discussed activitating Mychart with patient. This has been fully explained to the patient, who indicates understanding.  Aria Fowler RN

## 2021-10-22 LAB
ALBUMIN SERPL-MCNC: 3.8 G/DL (ref 3.5–5)
ALBUMIN/GLOB SERPL: 1 {RATIO} (ref 1.1–2.2)
ALP SERPL-CCNC: 101 U/L (ref 45–117)
ALT SERPL-CCNC: 29 U/L (ref 12–78)
ANION GAP SERPL CALC-SCNC: 8 MMOL/L (ref 5–15)
AST SERPL-CCNC: 18 U/L (ref 15–37)
BASOPHILS # BLD: 0 K/UL (ref 0–0.1)
BASOPHILS NFR BLD: 0 % (ref 0–1)
BILIRUB SERPL-MCNC: 0.4 MG/DL (ref 0.2–1)
BUN SERPL-MCNC: 11 MG/DL (ref 6–20)
BUN/CREAT SERPL: 20 (ref 12–20)
CALCIUM SERPL-MCNC: 9.1 MG/DL (ref 8.5–10.1)
CHLORIDE SERPL-SCNC: 102 MMOL/L (ref 97–108)
CHOLEST SERPL-MCNC: 204 MG/DL
CO2 SERPL-SCNC: 25 MMOL/L (ref 21–32)
CREAT SERPL-MCNC: 0.56 MG/DL (ref 0.55–1.02)
CREAT UR-MCNC: 135 MG/DL
DIFFERENTIAL METHOD BLD: ABNORMAL
EOSINOPHIL # BLD: 0.1 K/UL (ref 0–0.4)
EOSINOPHIL NFR BLD: 1 % (ref 0–7)
ERYTHROCYTE [DISTWIDTH] IN BLOOD BY AUTOMATED COUNT: 12 % (ref 11.5–14.5)
EST. AVERAGE GLUCOSE BLD GHB EST-MCNC: 243 MG/DL
GLOBULIN SER CALC-MCNC: 3.9 G/DL (ref 2–4)
GLUCOSE SERPL-MCNC: 239 MG/DL (ref 65–100)
HBA1C MFR BLD: 10.1 % (ref 4–5.6)
HCT VFR BLD AUTO: 42.1 % (ref 35–47)
HDLC SERPL-MCNC: 38 MG/DL
HDLC SERPL: 5.4 {RATIO} (ref 0–5)
HGB BLD-MCNC: 14.1 G/DL (ref 11.5–16)
IMM GRANULOCYTES # BLD AUTO: 0 K/UL (ref 0–0.04)
IMM GRANULOCYTES NFR BLD AUTO: 0 % (ref 0–0.5)
LDLC SERPL CALC-MCNC: 123 MG/DL (ref 0–100)
LYMPHOCYTES # BLD: 3.6 K/UL (ref 0.8–3.5)
LYMPHOCYTES NFR BLD: 37 % (ref 12–49)
MCH RBC QN AUTO: 28.8 PG (ref 26–34)
MCHC RBC AUTO-ENTMCNC: 33.5 G/DL (ref 30–36.5)
MCV RBC AUTO: 86.1 FL (ref 80–99)
MICROALBUMIN UR-MCNC: 2.19 MG/DL
MICROALBUMIN/CREAT UR-RTO: 16 MG/G (ref 0–30)
MONOCYTES # BLD: 0.5 K/UL (ref 0–1)
MONOCYTES NFR BLD: 6 % (ref 5–13)
NEUTS SEG # BLD: 5.4 K/UL (ref 1.8–8)
NEUTS SEG NFR BLD: 56 % (ref 32–75)
NRBC # BLD: 0 K/UL (ref 0–0.01)
NRBC BLD-RTO: 0 PER 100 WBC
PLATELET # BLD AUTO: 313 K/UL (ref 150–400)
PMV BLD AUTO: 10.1 FL (ref 8.9–12.9)
POTASSIUM SERPL-SCNC: 3.9 MMOL/L (ref 3.5–5.1)
PROT SERPL-MCNC: 7.7 G/DL (ref 6.4–8.2)
RBC # BLD AUTO: 4.89 M/UL (ref 3.8–5.2)
SODIUM SERPL-SCNC: 135 MMOL/L (ref 136–145)
TRIGL SERPL-MCNC: 215 MG/DL (ref ?–150)
TSH SERPL DL<=0.05 MIU/L-ACNC: 1.36 UIU/ML (ref 0.36–3.74)
VLDLC SERPL CALC-MCNC: 43 MG/DL
WBC # BLD AUTO: 9.7 K/UL (ref 3.6–11)

## 2021-10-22 PROCEDURE — 84443 ASSAY THYROID STIM HORMONE: CPT

## 2021-10-22 PROCEDURE — 82043 UR ALBUMIN QUANTITATIVE: CPT

## 2021-10-22 PROCEDURE — 80053 COMPREHEN METABOLIC PANEL: CPT

## 2021-10-22 PROCEDURE — 80061 LIPID PANEL: CPT

## 2021-10-22 PROCEDURE — 85025 COMPLETE CBC W/AUTO DIFF WBC: CPT

## 2021-10-22 PROCEDURE — 83036 HEMOGLOBIN GLYCOSYLATED A1C: CPT

## 2021-10-24 DIAGNOSIS — E11.65 UNCONTROLLED TYPE 2 DIABETES MELLITUS WITH HYPERGLYCEMIA (HCC): ICD-10-CM

## 2021-10-24 RX ORDER — METFORMIN HYDROCHLORIDE 500 MG/1
TABLET, EXTENDED RELEASE ORAL
Qty: 360 TABLET | Refills: 0 | Status: SHIPPED | OUTPATIENT
Start: 2021-10-24 | End: 2021-11-23 | Stop reason: SDUPTHER

## 2021-10-24 NOTE — PROGRESS NOTES
Your A1c is 10.1 (average glucose 243). This is much higher than it was. Please increase your metformin to 3 tabs daily with dinner for 3 days, then 4 tabs daily with dinner. Please call the office if you have questions, [phone]. [place] A1c es 10.1 (nivel promedio de glucosa 243). Es bastante vikki que el ano pasado. Por favor aumenta la metformina hasta 3 tabs diario con la sean por 3 jimenez, entonces bernard 4 tabs diario  con la sean. Si tiene preguntas por favor llamame a [phone]. Xin.

## 2021-11-23 ENCOUNTER — OFFICE VISIT (OUTPATIENT)
Dept: FAMILY MEDICINE CLINIC | Age: 31
End: 2021-11-23

## 2021-11-23 VITALS
SYSTOLIC BLOOD PRESSURE: 121 MMHG | DIASTOLIC BLOOD PRESSURE: 80 MMHG | WEIGHT: 160.2 LBS | HEIGHT: 59 IN | BODY MASS INDEX: 32.3 KG/M2 | OXYGEN SATURATION: 100 % | TEMPERATURE: 97.7 F | HEART RATE: 84 BPM

## 2021-11-23 DIAGNOSIS — E11.9 CONTROLLED TYPE 2 DIABETES MELLITUS WITHOUT COMPLICATION, WITHOUT LONG-TERM CURRENT USE OF INSULIN (HCC): Primary | ICD-10-CM

## 2021-11-23 DIAGNOSIS — E11.65 UNCONTROLLED TYPE 2 DIABETES MELLITUS WITH HYPERGLYCEMIA (HCC): ICD-10-CM

## 2021-11-23 LAB — GLUCOSE POC: NORMAL MG/DL

## 2021-11-23 PROCEDURE — 82962 GLUCOSE BLOOD TEST: CPT | Performed by: NURSE PRACTITIONER

## 2021-11-23 PROCEDURE — 99214 OFFICE O/P EST MOD 30 MIN: CPT | Performed by: NURSE PRACTITIONER

## 2021-11-23 RX ORDER — METFORMIN HYDROCHLORIDE 500 MG/1
TABLET, EXTENDED RELEASE ORAL
Qty: 360 TABLET | Refills: 0 | Status: SHIPPED | OUTPATIENT
Start: 2021-11-23 | End: 2022-04-25

## 2021-11-23 NOTE — PROGRESS NOTES
Coordination of Care  1. Have you been to the ER, urgent care clinic since your last visit? Hospitalized since your last visit? No    2. Have you seen or consulted any other health care providers outside of the [address] [place] [address] since your last visit? Include any pap smears or colon screening. No    Does the patient need refills? YES    Learning Assessment Complete?  yes  Depression Screening complete in the past 12 months? yes  Results for orders placed or performed in visit on 11/23/21   AMB POC GLUCOSE BLOOD, BY GLUCOSE MONITORING DEVICE   Result Value Ref Range    Glucose POC nf 163 MG/DL

## 2021-11-23 NOTE — PROGRESS NOTES
2021 : Dipak Martinez (: 1990) is a 33 y.o. female, established patient, here for evaluation of the following chief complaint(s):  Diabetes (f/u med refill)       ASSESSMENT/PLAN:  Below is the assessment and plan developed based on review of pertinent history, physical exam, labs, studies, and medications. 1. Controlled type 2 diabetes mellitus without complication, without long-term current use of insulin (HCC)  -     AMB POC GLUCOSE BLOOD, BY GLUCOSE MONITORING DEVICE  2. Uncontrolled type 2 diabetes mellitus with hyperglycemia (HCC)  -     metFORMIN ER (GLUCOPHAGE XR) 500 mg tablet; 3 po daily with dinner; bernard 3 diario con la sean, Normal, Disp-360 Tablet, R-0Please honor cash price of $10.00 for #360. **New Dose** 21    Return for 2 months nonfasting labs, then VV 1 wk LK. SUBJECTIVE/OBJECTIVE:  HPI     Results for orders placed or performed in visit on 21   AMB POC GLUCOSE BLOOD, BY GLUCOSE MONITORING DEVICE   Result Value Ref Range    Glucose POC nf 163 MG/DL     Latest Lab Result Choices:   Lab Results   Component Value Date/Time    Hemoglobin A1c 10.1 (H) 10/22/2021 06:51 AM    Hemoglobin A1c 6.6 (H) 2020 08:50 AM    Hemoglobin A1c 6.9 (H) 2019 10:12 AM    Glucose 239 (H) 10/22/2021 06:51 AM    Glucose POC nf 163 2021 02:18 PM    Microalbumin/Creat ratio (mg/g creat) 16 10/22/2021 06:51 AM    Microalbumin,urine random 2.19 10/22/2021 06:51 AM    LDL, calculated 123 (H) 10/22/2021 06:51 AM    Creatinine 0.56 10/22/2021 06:51 AM      Review of Systems:   General: No fever. Eyes: No blurry or double vision. Cardiovascular: No chest pain, dyspnea, or TIAs. Endocrine: No polydipsia or polyphagia. No weight loss. No hypoglycemic symptoms. Respiratory: No shortness of breath. Musculoskeletal: No myalgias. Genitourinary: No polyuria. Neurological: No numbness/tingling/pain in extremities. Extremities: No swelling.   Social History: reports that she has never smoked. She has never used smokeless tobacco. She reports current alcohol use. She reports that she does not use drugs. Current Medications:   Current Outpatient Medications   Medication Sig    metFORMIN ER (GLUCOPHAGE XR) 500 mg tablet 3 po daily with dinner; bernard 3 diario con la sean     Physical Examination:   Vitals:    11/23/21 1415   BP: 121/80   Pulse: 84   Temp: 97.7 °F (36.5 °C)   TempSrc: Temporal   SpO2: 100%   Weight: 160 lb 3.2 oz (72.7 kg)   Height: 4' 11.25\" (1.505 m)    Patient's last menstrual period was 11/23/2021. General appearance - well developed, no acute distress. Chest - clear to auscultation. Heart - regular rate and rhythm without murmurs, rubs, or gallops. Abdomen - bowel sounds present x 4, NT, ND  Extremities - no CCE. An electronic signature was used to authenticate this note.   -- Gabriele Vargas NP

## 2022-04-19 ENCOUNTER — LAB ONLY (OUTPATIENT)
Dept: FAMILY MEDICINE CLINIC | Age: 32
End: 2022-04-19

## 2022-04-19 ENCOUNTER — HOSPITAL ENCOUNTER (OUTPATIENT)
Dept: LAB | Age: 32
Discharge: HOME | End: 2022-04-19

## 2022-04-19 DIAGNOSIS — Z13.9 ENCOUNTER FOR SCREENING: ICD-10-CM

## 2022-04-19 DIAGNOSIS — Z13.9 ENCOUNTER FOR SCREENING: Primary | ICD-10-CM

## 2022-04-19 PROCEDURE — 85025 COMPLETE CBC W/AUTO DIFF WBC: CPT

## 2022-04-19 PROCEDURE — 80061 LIPID PANEL: CPT

## 2022-04-19 PROCEDURE — 82043 UR ALBUMIN QUANTITATIVE: CPT

## 2022-04-19 PROCEDURE — 80053 COMPREHEN METABOLIC PANEL: CPT

## 2022-04-19 PROCEDURE — 83036 HEMOGLOBIN GLYCOSYLATED A1C: CPT

## 2022-04-19 PROCEDURE — 84443 ASSAY THYROID STIM HORMONE: CPT

## 2022-04-20 LAB
ALBUMIN SERPL-MCNC: 3.9 G/DL (ref 3.5–5)
ALBUMIN/GLOB SERPL: 1.1 {RATIO} (ref 1.1–2.2)
ALP SERPL-CCNC: 86 U/L (ref 45–117)
ALT SERPL-CCNC: 60 U/L (ref 12–78)
ANION GAP SERPL CALC-SCNC: 6 MMOL/L (ref 5–15)
AST SERPL-CCNC: 45 U/L (ref 15–37)
BASOPHILS # BLD: 0.1 K/UL (ref 0–0.1)
BASOPHILS NFR BLD: 1 % (ref 0–1)
BILIRUB SERPL-MCNC: 0.3 MG/DL (ref 0.2–1)
BUN SERPL-MCNC: 12 MG/DL (ref 6–20)
BUN/CREAT SERPL: 21 (ref 12–20)
CALCIUM SERPL-MCNC: 8.9 MG/DL (ref 8.5–10.1)
CHLORIDE SERPL-SCNC: 103 MMOL/L (ref 97–108)
CHOLEST SERPL-MCNC: 214 MG/DL
CO2 SERPL-SCNC: 26 MMOL/L (ref 21–32)
CREAT SERPL-MCNC: 0.56 MG/DL (ref 0.55–1.02)
CREAT UR-MCNC: 244 MG/DL
DIFFERENTIAL METHOD BLD: NORMAL
EOSINOPHIL # BLD: 0.1 K/UL (ref 0–0.4)
EOSINOPHIL NFR BLD: 1 % (ref 0–7)
ERYTHROCYTE [DISTWIDTH] IN BLOOD BY AUTOMATED COUNT: 12.2 % (ref 11.5–14.5)
EST. AVERAGE GLUCOSE BLD GHB EST-MCNC: 220 MG/DL
GLOBULIN SER CALC-MCNC: 3.7 G/DL (ref 2–4)
GLUCOSE SERPL-MCNC: 263 MG/DL (ref 65–100)
HBA1C MFR BLD: 9.3 % (ref 4–5.6)
HCT VFR BLD AUTO: 40.2 % (ref 35–47)
HDLC SERPL-MCNC: 38 MG/DL
HDLC SERPL: 5.6 {RATIO} (ref 0–5)
HGB BLD-MCNC: 13.5 G/DL (ref 11.5–16)
IMM GRANULOCYTES # BLD AUTO: 0 K/UL (ref 0–0.04)
IMM GRANULOCYTES NFR BLD AUTO: 0 % (ref 0–0.5)
LDLC SERPL CALC-MCNC: 110.6 MG/DL (ref 0–100)
LYMPHOCYTES # BLD: 3.2 K/UL (ref 0.8–3.5)
LYMPHOCYTES NFR BLD: 34 % (ref 12–49)
MCH RBC QN AUTO: 29.2 PG (ref 26–34)
MCHC RBC AUTO-ENTMCNC: 33.6 G/DL (ref 30–36.5)
MCV RBC AUTO: 86.8 FL (ref 80–99)
MICROALBUMIN UR-MCNC: 2.93 MG/DL
MICROALBUMIN/CREAT UR-RTO: 12 MG/G (ref 0–30)
MONOCYTES # BLD: 0.5 K/UL (ref 0–1)
MONOCYTES NFR BLD: 5 % (ref 5–13)
NEUTS SEG # BLD: 5.5 K/UL (ref 1.8–8)
NEUTS SEG NFR BLD: 59 % (ref 32–75)
NRBC # BLD: 0 K/UL (ref 0–0.01)
NRBC BLD-RTO: 0 PER 100 WBC
PLATELET # BLD AUTO: 317 K/UL (ref 150–400)
PMV BLD AUTO: 9.9 FL (ref 8.9–12.9)
POTASSIUM SERPL-SCNC: 4.9 MMOL/L (ref 3.5–5.1)
PROT SERPL-MCNC: 7.6 G/DL (ref 6.4–8.2)
RBC # BLD AUTO: 4.63 M/UL (ref 3.8–5.2)
SODIUM SERPL-SCNC: 135 MMOL/L (ref 136–145)
TRIGL SERPL-MCNC: 327 MG/DL (ref ?–150)
TSH SERPL DL<=0.05 MIU/L-ACNC: 2.87 UIU/ML (ref 0.36–3.74)
VLDLC SERPL CALC-MCNC: 65.4 MG/DL
WBC # BLD AUTO: 9.3 K/UL (ref 3.6–11)

## 2022-04-25 ENCOUNTER — VIRTUAL VISIT (OUTPATIENT)
Dept: FAMILY MEDICINE CLINIC | Age: 32
End: 2022-04-25

## 2022-04-25 DIAGNOSIS — E11.65 UNCONTROLLED TYPE 2 DIABETES MELLITUS WITH HYPERGLYCEMIA (HCC): ICD-10-CM

## 2022-04-25 PROCEDURE — 99442 PR PHYS/QHP TELEPHONE EVALUATION 11-20 MIN: CPT | Performed by: NURSE PRACTITIONER

## 2022-04-25 RX ORDER — METFORMIN HYDROCHLORIDE 500 MG/1
TABLET, EXTENDED RELEASE ORAL
Qty: 360 TABLET | Refills: 0 | Status: SHIPPED | OUTPATIENT
Start: 2022-04-25 | End: 2022-04-25

## 2022-04-25 RX ORDER — METFORMIN HYDROCHLORIDE 1000 MG/1
1000 TABLET ORAL 2 TIMES DAILY WITH MEALS
Qty: 180 TABLET | Refills: 1 | Status: SHIPPED | OUTPATIENT
Start: 2022-04-25 | End: 2022-06-27 | Stop reason: SINTOL

## 2022-04-25 NOTE — PROGRESS NOTES
: Ayleen Restrepo  Patient identification verified with 2 identifiers. Consent: She and/or health care decision maker has provided verbal consent to proceed: Yes   Total Time: minutes: 11-20 minutes  Pursuant to the emergency declaration under the [facility], [facility] and the [employer] Act, this Virtual Telephone Visit was conducted to reduce the patient's risk of exposure to COVID-19. Assessment/Plan:   Diagnoses and all orders for this visit:    1. Uncontrolled type 2 diabetes mellitus with hyperglycemia (HCC)  -     metFORMIN ER (GLUCOPHAGE XR) 500 mg tablet; 1 po with breakfast and 3 po daily with dinner; bernard 1 con desayuno y 3 con la sean    Increased total metformin 500mg ER to 4 po qday. Continue walking and healthier eating patterns. Says she will purchase a glucometer, knows how to use it, and she is willing to measure fasting glucoses and 2 hours after breakfast 2 times per week. Follow-up and Dispositions    · Return for VV 2 months LK . Subjective:   Froylan De Leon is a 27 y.o. female evaluated via telephone on 4/25/2022. Chief Complaint   Patient presents with   Raul Jon     results.  Medication Refill     Patient needs medication refills for metformin. History of Present Illness  At the beginning of the pandemic she was nervous and not eating well. Today she ate 3 eggs with 1 piece of brown bread. Coffee without sugar and unsweetened almond milk. Not checking her glucoses for 3 months because her machine is broken. She is walking 3-4 times a week for 20-30 minutes each. Discussed A1c is 9.3, down from 10.1, and will adjust medication and lifestyle at this time. Review of Systems   General: No fever. Eyes: No blurry or double vision. Cardiovascular: No chest pain, dyspnea, or TIAs. Endocrine: No polydipsia or polyphagia. No weight loss.  No hypoglycemic symptoms. Respiratory: No shortness of breath. Musculoskeletal: No myalgias. Genitourinary: No polyuria. Neurological: No numbness/tingling/pain in extremities. Extremities: No swelling. Objective:     Latest Lab Result Choices discussed today:   Lab Results   Component Value Date/Time    Hemoglobin A1c 9.3 (H) 04/19/2022 08:31 PM    Hemoglobin A1c 10.1 (H) 10/22/2021 06:51 AM    Hemoglobin A1c 6.6 (H) 01/24/2020 08:50 AM    Glucose 263 (H) 04/19/2022 08:31 PM    Glucose POC nf 163 11/23/2021 02:18 PM    Microalbumin/Creat ratio (mg/g creat) 12 04/19/2022 08:31 PM    Microalbumin,urine random 2.93 04/19/2022 08:31 PM    LDL, calculated 110.6 (H) 04/19/2022 08:31 PM    Creatinine 0.56 04/19/2022 08:31 PM      Component      Latest Ref Rng & Units 4/19/2022   Sodium      136 - 145 mmol/L 135 (L)   Potassium      3.5 - 5.1 mmol/L 4.9   Chloride      97 - 108 mmol/L 103   CO2      21 - 32 mmol/L 26   Anion gap      5 - 15 mmol/L 6   Glucose      65 - 100 mg/dL 263 (H)   BUN      6 - 20 MG/DL 12   Creatinine      0.55 - 1.02 MG/DL 0.56   BUN/Creatinine ratio      12 - 20   21 (H)   GFR est AA      >60 ml/min/1.73m2 >60   GFR est non-AA      >60 ml/min/1.73m2 >60   Calcium      8.5 - 10.1 MG/DL 8.9   Bilirubin, total      0.2 - 1.0 MG/DL 0.3   ALT      12 - 78 U/L 60   AST      15 - 37 U/L 45 (H)   Alk. phosphatase      45 - 117 U/L 86   Protein, total      6.4 - 8.2 g/dL 7.6   Albumin      3.5 - 5.0 g/dL 3.9   Globulin      2.0 - 4.0 g/dL 3.7   A-G Ratio      1.1 - 2.2   1.1   TSH      0.36 - 3.74 uIU/mL 2.87         No physical exam performed due to telephone visit. I affirm this is a Patient Initiated Episode with a Patient who has not had a related appointment within my department in the past 7 days or scheduled within the next 24 hours. RADHA Mauro expressed understanding and agreed to this plan.

## 2022-04-25 NOTE — PROGRESS NOTES
Resources for glucometer from [facility] given to patient and explained. Discharge instructions discussed and medication explained. Patient verbalized understanding.   Aleida Salas

## 2022-04-25 NOTE — PROGRESS NOTES
Petra D eLeon is a 27 y.o. female  Chief Complaint   Patient presents with   [facility]     results.  Medication Refill     Patient needs medication refills for metformin. 1. Have you been to the ER, urgent care clinic since your last visit? Hospitalized since your last visit? No    2. Have you seen or consulted any other health care providers outside of the [address] [place] [address] since your last visit? Include any pap smears or colon screening.  No

## 2022-04-25 NOTE — PROGRESS NOTES
Diagnoses and all orders for this visit:    1. Uncontrolled type 2 diabetes mellitus with hyperglycemia (HCC)  -     metFORMIN (GLUCOPHAGE) 1,000 mg tablet; Take 1 Tablet by mouth two (2) times daily (with meals). This version of metformin has a current price of $10 \"cash\" for 180 tablets.

## 2022-06-27 ENCOUNTER — VIRTUAL VISIT (OUTPATIENT)
Dept: FAMILY MEDICINE CLINIC | Age: 32
End: 2022-06-27

## 2022-06-27 DIAGNOSIS — E11.65 UNCONTROLLED TYPE 2 DIABETES MELLITUS WITH HYPERGLYCEMIA (HCC): Primary | ICD-10-CM

## 2022-06-27 PROCEDURE — 99443 PR PHYS/QHP TELEPHONE EVALUATION 21-30 MIN: CPT | Performed by: NURSE PRACTITIONER

## 2022-06-27 RX ORDER — GLIPIZIDE 5 MG/1
5 TABLET ORAL 2 TIMES DAILY
Qty: 180 TABLET | Refills: 0 | Status: SHIPPED | OUTPATIENT
Start: 2022-06-27

## 2022-06-27 RX ORDER — METFORMIN HYDROCHLORIDE 850 MG/1
850 TABLET ORAL 2 TIMES DAILY WITH MEALS
Qty: 180 TABLET | Refills: 1 | Status: SHIPPED | OUTPATIENT
Start: 2022-06-27

## 2022-06-27 NOTE — PROGRESS NOTES
: Jose Lee  Patient identification verified with 2 identifiers. Consent: She and/or health care decision maker has provided verbal consent to proceed: Yes   Total Time: minutes: 21-30 minutes  Pursuant to the emergency declaration under the [address] [facility], [facility] and the [employer] and [employer] Act, this Virtual Telephone Visit was conducted to reduce the patient's risk of exposure to COVID-19. Assessment/Plan:   Diagnoses and all orders for this visit:    1. Uncontrolled type 2 diabetes mellitus with hyperglycemia (HCC)  -     metFORMIN (GLUCOPHAGE) 850 mg tablet; Take 1 Tablet by mouth two (2) times daily (with meals). For diabetes. Italian sig  -     glipiZIDE (GLUCOTROL) 5 mg tablet; Take 1 Tablet by mouth two (2) times a day. 30 minutes before meals 2 times a day. Rhea 1 tab 30 minutos antes de desayuno y antes de la sean. -needs refills  -we added glipizide and reduced metformin to 850 mg po bid in case this was contributing to headaches and dizziness. -VV 4 weeks, glucose measurements    Subjective:   Antoine Bae is a 27 y.o. female evaluated via telephone on 2022. Chief Complaint   Patient presents with    Follow-up     DM      History of Present Illness  She does check her glucoses at home. Fastin and highest is 260. She's taking the medicine and doing a good diet. Sometimes when she takes the medicine and she eats a small amount she has headache and dizziness. She took metformin after breakfast.  Normally this is after dinner. Her weight before was 169. Now it is 155. Schedule: 9 am, eats 10-11 am, she is in the house, eats again 1-1:30 pm, eats at 4-5 pm. Eats again at 8 pm. Goes to bed 10-11 pm.  Breakfast: shanae with water and cinnamon; Last night she ate 2 pupusas and her glucose this morning was 250 fasting.   Takes metformin with breakfast and takes it with lunch or dinner, depending on what she is going to eat. At 8 pm it is the dinner and it is a big meal.  The headache and dizziness is coming after taking metformin. Before this, she didn't have this. Review of Systems waking up at night to pee? 2 times, sometimes none. Denies increase in water drinking/thirst.  General: No fever. Eyes: No blurry or double vision. Cardiovascular: No chest pain, dyspnea, or TIAs. Endocrine: No polydipsia or polyphagia. No weight loss. No hypoglycemic symptoms. Respiratory: No shortness of breath. Musculoskeletal: No myalgias. Genitourinary: No polyuria. Neurological: No numbness/tingling/pain in extremities. Extremities: No swelling. Objective:     Latest Lab Result Choices discussed today:   Lab Results   Component Value Date/Time    Hemoglobin A1c 9.3 (H) 04/19/2022 08:31 PM    Hemoglobin A1c 10.1 (H) 10/22/2021 06:51 AM    Hemoglobin A1c 6.6 (H) 01/24/2020 08:50 AM    Glucose 263 (H) 04/19/2022 08:31 PM    Glucose POC nf 163 11/23/2021 02:18 PM    Microalbumin/Creat ratio (mg/g creat) 12 04/19/2022 08:31 PM    Microalbumin,urine random 2.93 04/19/2022 08:31 PM    LDL, calculated 110.6 (H) 04/19/2022 08:31 PM    Creatinine 0.56 04/19/2022 08:31 PM      Current Outpatient Medications   Medication Sig    metFORMIN (GLUCOPHAGE) 850 mg tablet Take 1 Tablet by mouth two (2) times daily (with meals). For diabetes. Urdu sig    glipiZIDE (GLUCOTROL) 5 mg tablet Take 1 Tablet by mouth two (2) times a day. 30 minutes before meals 2 times a day. Rhea 1 tab 30 minutos antes de desayuno y antes de la sean. No current facility-administered medications for this visit. No physical exam performed due to telephone visit. I affirm this is a Patient Initiated Episode with a Patient who has not had a related appointment within my department in the past 7 days or scheduled within the next 24 hours.   Irkofi Lopez, NP   Nat Barba expressed understanding and agreed to this plan.

## 2022-06-27 NOTE — PROGRESS NOTES
Coordination of Care  1. Have you been to the ER, urgent care clinic since your last visit? Hospitalized since your last visit? No    2. Have you seen or consulted any other health care providers outside of the [address] [place] [address] since your last visit? Include any pap smears or colon screening. No    Does the patient need refills? YES    Learning Assessment Complete?  yes  Depression Screening complete in the past 12 months? yes

## 2022-07-25 ENCOUNTER — VIRTUAL VISIT (OUTPATIENT)
Dept: FAMILY MEDICINE CLINIC | Age: 32
End: 2022-07-25

## 2022-07-25 DIAGNOSIS — E11.65 UNCONTROLLED TYPE 2 DIABETES MELLITUS WITH HYPERGLYCEMIA (HCC): Primary | ICD-10-CM

## 2022-07-25 PROCEDURE — 99442 PR PHYS/QHP TELEPHONE EVALUATION 11-20 MIN: CPT | Performed by: NURSE PRACTITIONER

## 2022-07-25 NOTE — PROGRESS NOTES
: Sheryle Binning  Patient identification verified with 2 identifiers. Consent: She and/or health care decision maker has provided verbal consent to proceed: Yes   Total Time: minutes: 11-20 minutes  Pursuant to the emergency declaration under the [address] [facility], [facility] authority and the [employer] and [employer] Act, this Virtual Telephone Visit was conducted to reduce the patient's risk of exposure to COVID-19. Assessment/Plan:   Diagnoses and all orders for this visit:    1. Uncontrolled type 2 diabetes mellitus with hyperglycemia (HCC)  -     HEMOGLOBIN A1C WITH EAG; Future  No headaches or dizziness. She mixed up her medication bottles of metformin, started taking her medications correctly 22 days ago. 5-6 weeks return nonfasting a1c  Follow-up and Dispositions    Return for F2F nonfasting labs in 5-6 weeks, then 1 week after for VV LK results. Subjective:   Oscar Cross is a 27 y.o. female evaluated via telephone on 7/25/2022. Chief Complaint   Patient presents with    Follow-up     labs      History of Present Illness  She had a virtual appt a month ago. Also working out. Fasting 140, 130 this week.   Hemoglobin A1c   Date Value Ref Range Status   04/19/2022 9.3 (H) 4.0 - 5.6 % Final     Comment:     NEW METHOD  PLEASE NOTE NEW REFERENCE RANGE  (NOTE)  HbA1C Interpretive Ranges  <5.7              Normal  5.7 - 6.4         Consider Prediabetes  >6.5              Consider Diabetes     10/22/2021 10.1 (H) 4.0 - 5.6 % Final     Comment:     NEW METHOD  PLEASE NOTE NEW REFERENCE RANGE  (NOTE)  HbA1C Interpretive Ranges  <5.7              Normal  5.7 - 6.4         Consider Prediabetes  >6.5              Consider Diabetes     01/24/2020 6.6 (H) 4.0 - 5.6 % Final     Comment:     NEW METHOD  PLEASE NOTE NEW REFERENCE RANGE  (NOTE)  HbA1C Interpretive Ranges  <5.7              Normal  5.7 - 6.4         Consider Prediabetes  >6.5              Consider Diabetes     11/08/2019 6.9 (H) 4.2 - 6.3 % Final   12/20/2017 7.4 (H) 4.2 - 6.3 % Final   07/19/2017 6.8 (H) 4.2 - 6.3 % Final   04/04/2017 6.9 (H) 4.2 - 6.3 % Final   07/19/2016 6.3 4.2 - 6.3 % Final   03/15/2016 9.1 (H) 4.2 - 6.3 % Final       Component      Latest Ref Rng & Units 4/19/2022   WBC      3.6 - 11.0 K/uL 9.3   RBC      3.80 - 5.20 M/uL 4.63   HGB      11.5 - 16.0 g/dL 13.5   HCT      35.0 - 47.0 % 40.2   MCV      80.0 - 99.0 FL 86.8   MCH      26.0 - 34.0 PG 29.2   MCHC      30.0 - 36.5 g/dL 33.6   RDW      11.5 - 14.5 % 12.2   PLATELET      635 - 325 K/uL 317   MPV      8.9 - 12.9 FL 9.9   NRBC      0  WBC 0.0   ABSOLUTE NRBC      0.00 - 0.01 K/uL 0.00   NEUTROPHILS      32 - 75 % 59   LYMPHOCYTES      12 - 49 % 34   MONOCYTES      5 - 13 % 5   EOSINOPHILS      0 - 7 % 1   BASOPHILS      0 - 1 % 1   IMMATURE GRANULOCYTES      0.0 - 0.5 % 0   ABS. NEUTROPHILS      1.8 - 8.0 K/UL 5.5   ABS. LYMPHOCYTES      0.8 - 3.5 K/UL 3.2   ABS. MONOCYTES      0.0 - 1.0 K/UL 0.5   ABS. EOSINOPHILS      0.0 - 0.4 K/UL 0.1   ABS. BASOPHILS      0.0 - 0.1 K/UL 0.1   ABS. IMM. GRANS.      0.00 - 0.04 K/UL 0.0   DF       AUTOMATED   Sodium      136 - 145 mmol/L 135 (L)   Potassium      3.5 - 5.1 mmol/L 4.9   Chloride      97 - 108 mmol/L 103   CO2      21 - 32 mmol/L 26   Anion gap      5 - 15 mmol/L 6   Glucose      65 - 100 mg/dL 263 (H)   BUN      6 - 20 MG/DL 12   Creatinine      0.55 - 1.02 MG/DL 0.56   BUN/Creatinine ratio      12 - 20   21 (H)   GFR est AA      >60 ml/min/1.73m2 >60   GFR est non-AA      >60 ml/min/1.73m2 >60   Calcium      8.5 - 10.1 MG/DL 8.9   Bilirubin, total      0.2 - 1.0 MG/DL 0.3   ALT      12 - 78 U/L 60   AST      15 - 37 U/L 45 (H)   Alk.  phosphatase      45 - 117 U/L 86   Protein, total      6.4 - 8.2 g/dL 7.6   Albumin      3.5 - 5.0 g/dL 3.9   Globulin      2.0 - 4.0 g/dL 3.7   A-G Ratio      1.1 - 2.2   1.1   Cholesterol, total <200 MG/ (H)   Triglyceride      <150 MG/ (H)   HDL Cholesterol      MG/DL 38   LDL, calculated      0 - 100 MG/.6 (H)   VLDL, calculated      MG/DL 65.4   CHOL/HDL Ratio      0.0 - 5.0   5.6 (H)   Microalbumin,urine random      MG/DL 2.93   Creatinine, urine      mg/dL 244.00   Microalbumin/Creat. Ratio      0 - 30 mg/g 12   Hemoglobin A1c, (calculated)      4.0 - 5.6 % 9.3 (H)   Est. average glucose      mg/dL 220   TSH      0.36 - 3.74 uIU/mL 2.87     Objective:     Current Outpatient Medications   Medication Sig    metFORMIN (GLUCOPHAGE) 850 mg tablet Take 1 Tablet by mouth two (2) times daily (with meals). For diabetes. Austrian sig    glipiZIDE (GLUCOTROL) 5 mg tablet Take 1 Tablet by mouth two (2) times a day. 30 minutes before meals 2 times a day. Rhea 1 tab 30 minutos antes de desayuno y antes de la sean. No current facility-administered medications for this visit. No physical exam performed due to telephone visit. I affirm this is a Patient Initiated Episode with a Patient who has not had a related appointment within my department in the past 7 days or scheduled within the next 24 hours. RADHA Park expressed understanding and agreed to this plan.

## 2022-07-25 NOTE — PROGRESS NOTES
Coordination of Care  1. Have you been to the ER, urgent care clinic since your last visit? Hospitalized since your last visit? No    2. Have you seen or consulted any other health care providers outside of the [address] [place] [address] since your last visit? Include any pap smears or colon screening. No    Does the patient need refills? NO    Learning Assessment Complete?  yes  Depression Screening complete in the past 12 months? yes

## 2023-04-24 ENCOUNTER — HOSPITAL ENCOUNTER (OUTPATIENT)
Dept: LAB | Age: 33
Discharge: HOME | End: 2023-04-24

## 2023-04-24 ENCOUNTER — OFFICE VISIT (OUTPATIENT)
Dept: FAMILY MEDICINE CLINIC | Facility: CLINIC | Age: 33
End: 2023-04-24

## 2023-04-24 VITALS
DIASTOLIC BLOOD PRESSURE: 75 MMHG | WEIGHT: 155.2 LBS | BODY MASS INDEX: 31.08 KG/M2 | HEART RATE: 85 BPM | SYSTOLIC BLOOD PRESSURE: 114 MMHG | OXYGEN SATURATION: 97 % | TEMPERATURE: 99.1 F

## 2023-04-24 DIAGNOSIS — Z63.9 FAMILY CIRCUMSTANCE: ICD-10-CM

## 2023-04-24 DIAGNOSIS — E11.65 UNCONTROLLED TYPE 2 DIABETES MELLITUS WITH HYPERGLYCEMIA (HCC): Primary | ICD-10-CM

## 2023-04-24 LAB
BILIRUB UR QL STRIP: NEGATIVE
GLUCOSE UR-MCNC: NORMAL MG/DL
HGB BLD-MCNC: 322 G/DL
KETONES P FAST UR STRIP-MCNC: NEGATIVE MG/DL
PH UR STRIP: 5.5 [PH] (ref 4.6–8)
PROT UR QL STRIP: NEGATIVE
SP GR UR STRIP: 1.02 (ref 1–1.03)
UA UROBILINOGEN AMB POC: NORMAL (ref 0.2–1)
URINALYSIS CLARITY POC: CLEAR
URINALYSIS COLOR POC: YELLOW
URINE BLOOD POC: NEGATIVE
URINE LEUKOCYTES POC: NEGATIVE
URINE NITRITES POC: NEGATIVE

## 2023-04-24 PROCEDURE — 85018 HEMOGLOBIN: CPT | Performed by: NURSE PRACTITIONER

## 2023-04-24 PROCEDURE — 83036 HEMOGLOBIN GLYCOSYLATED A1C: CPT

## 2023-04-24 PROCEDURE — 81002 URINALYSIS NONAUTO W/O SCOPE: CPT | Performed by: NURSE PRACTITIONER

## 2023-04-24 PROCEDURE — 36415 COLL VENOUS BLD VENIPUNCTURE: CPT

## 2023-04-24 PROCEDURE — 99214 OFFICE O/P EST MOD 30 MIN: CPT | Performed by: NURSE PRACTITIONER

## 2023-04-24 RX ORDER — METFORMIN HYDROCHLORIDE 850 MG/1
850 TABLET ORAL 2 TIMES DAILY WITH MEALS
Qty: 180 TABLET | Refills: 1 | Status: SHIPPED | OUTPATIENT
Start: 2023-04-24

## 2023-04-24 RX ORDER — GLIPIZIDE 5 MG/1
5 TABLET ORAL 2 TIMES DAILY
Qty: 180 TABLET | Refills: 1 | Status: SHIPPED | OUTPATIENT
Start: 2023-04-24

## 2023-04-24 SDOH — SOCIAL STABILITY - SOCIAL INSECURITY: PROBLEM RELATED TO PRIMARY SUPPORT GROUP, UNSPECIFIED: Z63.9

## 2023-04-24 NOTE — PROGRESS NOTES
Coordination of Care  1. Have you been to the ER, urgent care clinic since your last visit? Hospitalized since your last visit? No    2. Have you seen or consulted any other health care providers outside of the [address] [place] [address] since your last visit? Include any pap smears or colon screening. No    Does the patient need refills? YES Metformin and glipizide    Learning Assessment Complete? yes  Depression Screening complete in the past 12 months? yes    Chief Complaint   Patient presents with    Diabetes     Pt states she ran out of glipizide and metformin last week (04/19/2023).     Other     PHQ depression screen, pt states her mother is very ill in her home country, she's very worried about her

## 2023-04-24 NOTE — PROGRESS NOTES
2023 : Jasper Calixto (: 1990) is a 26 y.o. female, established patient, here for evaluation of the following chief complaint(s):  Diabetes (Pt states she ran out of glipizide and metformin last week (2023). ) and Other (PHQ depression screen, pt states her mother is very ill in her [place], she's very worried about her )     ASSESSMENT/PLAN:  Below is the assessment and plan developed based on review of pertinent history, physical exam, labs, studies, and medications. 1. Uncontrolled type 2 diabetes mellitus with hyperglycemia (HCC)  -     AMB POC HEMOGLOBIN (HGB)  -     AMB POC URINALYSIS DIP STICK MANUAL W/O MICRO  -     glipiZIDE (GLUCOTROL) 5 mg tablet; Take 1 Tablet by mouth two (2) times a day. 30 minutes before meals 2 times a day. Rhea 1 tab 30 minutos antes de desayuno y antes de la sean., Normal, Disp-180 Tablet, R-1  -     metFORMIN (GLUCOPHAGE) 850 mg tablet; Take 1 Tablet by mouth two (2) times daily (with meals). For diabetes. Maltese sig, Normal, Disp-180 Tablet, R-1  -     HEMOGLOBIN A1C WITH EAG; Future  2. Family circumstance    Return for follow up dm 1 month 40 min. SUBJECTIVE/OBJECTIVE:  HPI Out of metformin and glipizide for 1 week.   Results for orders placed or performed in visit on 23   AMB POC HEMOGLOBIN (HGB)   Result Value Ref Range    Hemoglobin (POC) 322 G/DL   AMB POC URINALYSIS DIP STICK MANUAL W/O MICRO   Result Value Ref Range    Color (UA POC) Yellow     Clarity (UA POC) Clear     Glucose (UA POC) 2+ Negative    Bilirubin (UA POC) Negative Negative    Ketones (UA POC) Negative Negative    Specific gravity (UA POC) 1.025 1.001 - 1.035    Blood (UA POC) Negative Negative    pH (UA POC) 5.5 4.6 - 8.0    Protein (UA POC) Negative Negative    Urobilinogen (UA POC) 0.2 mg/dL 0.2 - 1    Nitrites (UA POC) Negative Negative    Leukocyte esterase (UA POC) Negative Negative     Lab Results   Component Value Date/Time    Hemoglobin A1c 9.3 (H) 04/19/2022 08:31 PM    Hemoglobin A1c 10.1 (H) 10/22/2021 06:51 AM    Hemoglobin A1c 6.6 (H) 01/24/2020 08:50 AM    Microalbumin/Creat ratio (mg/g creat) 12 04/19/2022 08:31 PM    Microalbumin,urine random 2.93 04/19/2022 08:31 PM    LDL, calculated 110.6 (H) 04/19/2022 08:31 PM    Creatinine 0.56 04/19/2022 08:31 PM     Review of Systems: No weight loss, polydipsia, polyuria, polyphagia, hypoglycemic symptoms, numbness/tingling/pain of extremities, myalgias, vision changes, chest pain, dyspnea, shortness of breath, TIAs, swelling. Social History:  reports that she has never smoked. She has never used smokeless tobacco. She reports current alcohol use. She reports that she does not use drugs. Current Medications:   Current Outpatient Medications   Medication Sig    glipiZIDE (GLUCOTROL) 5 mg tablet Take 1 Tablet by mouth two (2) times a day. 30 minutes before meals 2 times a day. Rhea 1 tab 30 minutos antes de desayuno y antes de la sean. metFORMIN (GLUCOPHAGE) 850 mg tablet Take 1 Tablet by mouth two (2) times daily (with meals). For diabetes. Mongolian sig     Physical Examination: Patient's last menstrual period was 04/19/2023 (approximate). Blood pressure 114/75, pulse 85, temperature 99.1 °F (37.3 °C), temperature source Temporal, weight 155 lb 3.2 oz (70.4 kg), last menstrual period 04/19/2023, SpO2 97 %. General appearance - well developed, no acute distress. Chest - clear to auscultation. Heart - regular rate and rhythm without murmurs, rubs, or gallops. Abdomen - bowel sounds present x 4, NT, ND  Extremities - no CCE. An electronic signature was used to authenticate this note.   -- Salo Maharaj NP

## 2023-04-24 NOTE — PROGRESS NOTES
Results for orders placed or performed in visit on 04/24/23   AMB POC HEMOGLOBIN (HGB)   Result Value Ref Range    Hemoglobin (POC) 322 G/DL   AMB POC URINALYSIS DIP STICK MANUAL W/O MICRO   Result Value Ref Range    Color (UA POC) Yellow     Clarity (UA POC) Clear     Glucose (UA POC) 2+ Negative    Bilirubin (UA POC) Negative Negative    Ketones (UA POC) Negative Negative    Specific gravity (UA POC) 1.025 1.001 - 1.035    Blood (UA POC) Negative Negative    pH (UA POC) 5.5 4.6 - 8.0    Protein (UA POC) Negative Negative    Urobilinogen (UA POC) 0.2 mg/dL 0.2 - 1    Nitrites (UA POC) Negative Negative    Leukocyte esterase (UA POC) Negative Negative

## 2023-04-24 NOTE — PROGRESS NOTES
Pham Pollard seen at d/c, full name and  verified, given After visit Summary and reviewed today's visit with patient along with instructions on when it is recommended to come back. I have reviewed the provider's instructions with the patient, answering all questions to her satisfaction. Patient verbalized understanding.   Castillo Winn RN

## 2023-04-25 LAB
EST. AVERAGE GLUCOSE BLD GHB EST-MCNC: 249 MG/DL
HBA1C MFR BLD: 10.3 % (ref 4–5.6)

## 2023-05-22 RX ORDER — GLIPIZIDE 5 MG/1
5 TABLET ORAL 2 TIMES DAILY
COMMUNITY
Start: 2023-04-24 | End: 2023-06-29 | Stop reason: SDUPTHER

## 2023-06-29 ENCOUNTER — OFFICE VISIT (OUTPATIENT)
Age: 33
End: 2023-06-29

## 2023-06-29 VITALS
TEMPERATURE: 98.6 F | HEIGHT: 59 IN | DIASTOLIC BLOOD PRESSURE: 82 MMHG | BODY MASS INDEX: 31.45 KG/M2 | OXYGEN SATURATION: 98 % | SYSTOLIC BLOOD PRESSURE: 122 MMHG | HEART RATE: 90 BPM | WEIGHT: 156 LBS

## 2023-06-29 DIAGNOSIS — E11.65 TYPE 2 DIABETES MELLITUS WITH HYPERGLYCEMIA, WITHOUT LONG-TERM CURRENT USE OF INSULIN (HCC): Primary | ICD-10-CM

## 2023-06-29 LAB — GLUCOSE, POC: ABNORMAL MG/DL

## 2023-06-29 PROCEDURE — 3046F HEMOGLOBIN A1C LEVEL >9.0%: CPT | Performed by: NURSE PRACTITIONER

## 2023-06-29 PROCEDURE — 99214 OFFICE O/P EST MOD 30 MIN: CPT | Performed by: NURSE PRACTITIONER

## 2023-06-29 PROCEDURE — 82962 GLUCOSE BLOOD TEST: CPT | Performed by: NURSE PRACTITIONER

## 2023-06-29 RX ORDER — GLIPIZIDE 5 MG/1
TABLET ORAL
Qty: 120 TABLET | Refills: 1 | Status: SHIPPED | OUTPATIENT
Start: 2023-06-29

## 2023-06-29 ASSESSMENT — PATIENT HEALTH QUESTIONNAIRE - PHQ9
SUM OF ALL RESPONSES TO PHQ QUESTIONS 1-9: 1
SUM OF ALL RESPONSES TO PHQ9 QUESTIONS 1 & 2: 1
SUM OF ALL RESPONSES TO PHQ QUESTIONS 1-9: 1
1. LITTLE INTEREST OR PLEASURE IN DOING THINGS: 0
SUM OF ALL RESPONSES TO PHQ QUESTIONS 1-9: 1
2. FEELING DOWN, DEPRESSED OR HOPELESS: 1
SUM OF ALL RESPONSES TO PHQ QUESTIONS 1-9: 1

## 2023-09-29 ENCOUNTER — HOSPITAL ENCOUNTER (OUTPATIENT)
Facility: HOSPITAL | Age: 33
Setting detail: SPECIMEN
Discharge: HOME | End: 2023-10-02

## 2023-09-29 ENCOUNTER — OFFICE VISIT (OUTPATIENT)
Age: 33
End: 2023-09-29

## 2023-09-29 VITALS
OXYGEN SATURATION: 96 % | DIASTOLIC BLOOD PRESSURE: 78 MMHG | BODY MASS INDEX: 32.32 KG/M2 | WEIGHT: 161.4 LBS | SYSTOLIC BLOOD PRESSURE: 123 MMHG | HEART RATE: 87 BPM | TEMPERATURE: 97.1 F

## 2023-09-29 DIAGNOSIS — E11.65 TYPE 2 DIABETES MELLITUS WITH HYPERGLYCEMIA, WITHOUT LONG-TERM CURRENT USE OF INSULIN (HCC): Primary | ICD-10-CM

## 2023-09-29 DIAGNOSIS — E11.65 TYPE 2 DIABETES MELLITUS WITH HYPERGLYCEMIA, WITHOUT LONG-TERM CURRENT USE OF INSULIN (HCC): ICD-10-CM

## 2023-09-29 LAB — GLUCOSE, POC: 165 MG/DL

## 2023-09-29 PROCEDURE — 99214 OFFICE O/P EST MOD 30 MIN: CPT | Performed by: NURSE PRACTITIONER

## 2023-09-29 PROCEDURE — 82962 GLUCOSE BLOOD TEST: CPT | Performed by: NURSE PRACTITIONER

## 2023-09-29 PROCEDURE — 82570 ASSAY OF URINE CREATININE: CPT

## 2023-09-29 PROCEDURE — 36415 COLL VENOUS BLD VENIPUNCTURE: CPT

## 2023-09-29 PROCEDURE — 83036 HEMOGLOBIN GLYCOSYLATED A1C: CPT

## 2023-09-29 PROCEDURE — 80061 LIPID PANEL: CPT

## 2023-09-29 PROCEDURE — 80048 BASIC METABOLIC PNL TOTAL CA: CPT

## 2023-09-29 PROCEDURE — 82043 UR ALBUMIN QUANTITATIVE: CPT

## 2023-09-29 PROCEDURE — 3046F HEMOGLOBIN A1C LEVEL >9.0%: CPT | Performed by: NURSE PRACTITIONER

## 2023-09-29 RX ORDER — GLIPIZIDE 5 MG/1
TABLET ORAL
Qty: 120 TABLET | Refills: 2 | Status: SHIPPED | OUTPATIENT
Start: 2023-09-29

## 2023-09-29 SDOH — ECONOMIC STABILITY: TRANSPORTATION INSECURITY
IN THE PAST 12 MONTHS, HAS THE LACK OF TRANSPORTATION KEPT YOU FROM MEDICAL APPOINTMENTS OR FROM GETTING MEDICATIONS?: NO

## 2023-09-29 SDOH — ECONOMIC STABILITY: INCOME INSECURITY: HOW HARD IS IT FOR YOU TO PAY FOR THE VERY BASICS LIKE FOOD, HOUSING, MEDICAL CARE, AND HEATING?: NOT HARD AT ALL

## 2023-09-29 SDOH — ECONOMIC STABILITY: HOUSING INSECURITY: IN THE LAST 12 MONTHS, HOW MANY PLACES HAVE YOU LIVED?: 2

## 2023-09-29 SDOH — ECONOMIC STABILITY: INCOME INSECURITY: IN THE LAST 12 MONTHS, WAS THERE A TIME WHEN YOU WERE NOT ABLE TO PAY THE MORTGAGE OR RENT ON TIME?: NO

## 2023-09-29 SDOH — ECONOMIC STABILITY: FOOD INSECURITY: WITHIN THE PAST 12 MONTHS, YOU WORRIED THAT YOUR FOOD WOULD RUN OUT BEFORE YOU GOT MONEY TO BUY MORE.: NEVER TRUE

## 2023-09-29 SDOH — ECONOMIC STABILITY: FOOD INSECURITY: WITHIN THE PAST 12 MONTHS, THE FOOD YOU BOUGHT JUST DIDN'T LAST AND YOU DIDN'T HAVE MONEY TO GET MORE.: NEVER TRUE

## 2023-09-29 SDOH — ECONOMIC STABILITY: HOUSING INSECURITY
IN THE LAST 12 MONTHS, WAS THERE A TIME WHEN YOU DID NOT HAVE A STEADY PLACE TO SLEEP OR SLEPT IN A SHELTER (INCLUDING NOW)?: NO

## 2023-09-29 ASSESSMENT — PATIENT HEALTH QUESTIONNAIRE - PHQ9
5. POOR APPETITE OR OVEREATING: 0
6. FEELING BAD ABOUT YOURSELF - OR THAT YOU ARE A FAILURE OR HAVE LET YOURSELF OR YOUR FAMILY DOWN: 0
SUM OF ALL RESPONSES TO PHQ QUESTIONS 1-9: 6
2. FEELING DOWN, DEPRESSED OR HOPELESS: 3
3. TROUBLE FALLING OR STAYING ASLEEP: 3
SUM OF ALL RESPONSES TO PHQ9 QUESTIONS 1 & 2: 3
4. FEELING TIRED OR HAVING LITTLE ENERGY: 0
7. TROUBLE CONCENTRATING ON THINGS, SUCH AS READING THE NEWSPAPER OR WATCHING TELEVISION: 0
SUM OF ALL RESPONSES TO PHQ QUESTIONS 1-9: 6
1. LITTLE INTEREST OR PLEASURE IN DOING THINGS: 0
9. THOUGHTS THAT YOU WOULD BE BETTER OFF DEAD, OR OF HURTING YOURSELF: 0
10. IF YOU CHECKED OFF ANY PROBLEMS, HOW DIFFICULT HAVE THESE PROBLEMS MADE IT FOR YOU TO DO YOUR WORK, TAKE CARE OF THINGS AT HOME, OR GET ALONG WITH OTHER PEOPLE: 0
SUM OF ALL RESPONSES TO PHQ QUESTIONS 1-9: 6
8. MOVING OR SPEAKING SO SLOWLY THAT OTHER PEOPLE COULD HAVE NOTICED. OR THE OPPOSITE, BEING SO FIGETY OR RESTLESS THAT YOU HAVE BEEN MOVING AROUND A LOT MORE THAN USUAL: 0
SUM OF ALL RESPONSES TO PHQ QUESTIONS 1-9: 6

## 2023-09-29 ASSESSMENT — LIFESTYLE VARIABLES
HOW OFTEN DO YOU HAVE A DRINK CONTAINING ALCOHOL: NEVER
HOW MANY STANDARD DRINKS CONTAINING ALCOHOL DO YOU HAVE ON A TYPICAL DAY: PATIENT DOES NOT DRINK

## 2023-09-29 ASSESSMENT — SOCIAL DETERMINANTS OF HEALTH (SDOH)

## 2023-09-29 NOTE — PROGRESS NOTES
Pt's name and  verified. AVS provided. Medication reviewed and education provided. Pt instructed to schedule virtual follow up appointment in 5 weeks per Dee Conrad. Pt verbalizes understanding. Time allowed for questions, no questions at this time.  Sukhjinder Mary RN

## 2023-09-29 NOTE — PROGRESS NOTES
Name and  of patient verified. Labs ordered by the provider were collected with no complications. Patient made aware that results will be communicated to them.

## 2023-09-29 NOTE — PROGRESS NOTES
2023 : Rozina Martinez (: 1990) is a 26 y.o. female, established patient, here for evaluation of the following chief complaint(s):  Diabetes (F/U, Diabetic consult) and PHQ-9 Screen (Score 6, patient states her sister passed away 2 weeks ago, but that she has good family support )     ASSESSMENT/PLAN:  Below is the assessment and plan developed based on review of pertinent history, physical exam, labs, studies, and medications. 1. Type 2 diabetes mellitus with hyperglycemia, without long-term current use of insulin (HCC)  -     AMB POC GLUCOSE BLOOD, BY GLUCOSE MONITORING DEVICE  -     Hemoglobin A1C; Future  -     Lipid Panel; Future  -     Microalbumin / Creatinine Urine Ratio; Future  -     Basic Metabolic Panel; Future  -     glipiZIDE (GLUCOTROL) 5 MG tablet; 1 po bid 30 min ac meals, Disp-120 tablet, R-2Normal  -     metFORMIN (GLUCOPHAGE) 850 MG tablet; 1 po bid AFTER eating, Disp-120 tablet, R-2Normal  Her sister was on dialysis and never took her diabetes medicine. Return for Dr. Red Langston 5 weeks VV, results, possible med adjustment. SUBJECTIVE/OBJECTIVE:  HPI   No SI.   Results for orders placed or performed in visit on 23   AMB POC GLUCOSE BLOOD, BY GLUCOSE MONITORING DEVICE   Result Value Ref Range    Glucose,  MG/DL   Fasting    Latest Lab Result Choices: No results found for: \"HBA1C\", \"GLU\", \"GESTF\", \"MCA2\", \"MCAU\", \"LDL\", \"LDLC\", \"TREVER\", \"CREAPOC\", \"CREA\"   Hemoglobin A1C   Date Value Ref Range Status   2023 10.3 (H) 4.0 - 5.6 % Final     Comment:     NEW METHOD  PLEASE NOTE NEW REFERENCE RANGE  (NOTE)  HbA1C Interpretive Ranges  <5.7              Normal  5.7 - 6.4         Consider Prediabetes  >6.5              Consider Diabetes     2022 9.3 (H) 4.0 - 5.6 % Final     Comment:     NEW METHOD  PLEASE NOTE NEW REFERENCE RANGE  (NOTE)  HbA1C Interpretive Ranges  <5.7              Normal  5.7 - 6.4         Consider Prediabetes  >6.5

## 2023-09-30 LAB
ANION GAP SERPL CALC-SCNC: 4 MMOL/L (ref 5–15)
BUN SERPL-MCNC: 9 MG/DL (ref 6–20)
BUN/CREAT SERPL: 17 (ref 12–20)
CALCIUM SERPL-MCNC: 9.4 MG/DL (ref 8.5–10.1)
CHLORIDE SERPL-SCNC: 104 MMOL/L (ref 97–108)
CHOLEST SERPL-MCNC: 216 MG/DL
CO2 SERPL-SCNC: 28 MMOL/L (ref 21–32)
CREAT SERPL-MCNC: 0.54 MG/DL (ref 0.55–1.02)
CREAT UR-MCNC: 163 MG/DL
EST. AVERAGE GLUCOSE BLD GHB EST-MCNC: 154 MG/DL
GLUCOSE SERPL-MCNC: 155 MG/DL (ref 65–100)
HBA1C MFR BLD: 7 % (ref 4–5.6)
HDLC SERPL-MCNC: 45 MG/DL
HDLC SERPL: 4.8 (ref 0–5)
LDLC SERPL CALC-MCNC: 131.8 MG/DL (ref 0–100)
MICROALBUMIN UR-MCNC: 0.98 MG/DL
MICROALBUMIN/CREAT UR-RTO: 6 MG/G (ref 0–30)
POTASSIUM SERPL-SCNC: 3.9 MMOL/L (ref 3.5–5.1)
SODIUM SERPL-SCNC: 136 MMOL/L (ref 136–145)
TRIGL SERPL-MCNC: 196 MG/DL
VLDLC SERPL CALC-MCNC: 39.2 MG/DL

## 2023-10-03 NOTE — RESULT ENCOUNTER NOTE
A1c 7.0, at goal, down from 10.3. Have you been doing anything different the last few months? Has follow up to discuss.

## 2023-10-30 ENCOUNTER — TELEMEDICINE (OUTPATIENT)
Age: 33
End: 2023-10-30

## 2023-10-30 DIAGNOSIS — E11.65 TYPE 2 DIABETES MELLITUS WITH HYPERGLYCEMIA, WITHOUT LONG-TERM CURRENT USE OF INSULIN (HCC): Primary | ICD-10-CM

## 2023-10-30 DIAGNOSIS — E78.2 MIXED HYPERLIPIDEMIA: ICD-10-CM

## 2023-10-30 PROCEDURE — 99443 PR PHYS/QHP TELEPHONE EVALUATION 21-30 MIN: CPT | Performed by: FAMILY MEDICINE

## 2023-10-30 RX ORDER — GLIPIZIDE 5 MG/1
TABLET ORAL
Qty: 180 TABLET | Refills: 1 | Status: SHIPPED | OUTPATIENT
Start: 2023-10-30

## 2023-10-30 SDOH — ECONOMIC STABILITY: INCOME INSECURITY: HOW HARD IS IT FOR YOU TO PAY FOR THE VERY BASICS LIKE FOOD, HOUSING, MEDICAL CARE, AND HEATING?: NOT VERY HARD

## 2023-10-30 SDOH — ECONOMIC STABILITY: FOOD INSECURITY: WITHIN THE PAST 12 MONTHS, YOU WORRIED THAT YOUR FOOD WOULD RUN OUT BEFORE YOU GOT MONEY TO BUY MORE.: NEVER TRUE

## 2023-10-30 SDOH — ECONOMIC STABILITY: FOOD INSECURITY: WITHIN THE PAST 12 MONTHS, THE FOOD YOU BOUGHT JUST DIDN'T LAST AND YOU DIDN'T HAVE MONEY TO GET MORE.: NEVER TRUE

## 2023-10-30 ASSESSMENT — PATIENT HEALTH QUESTIONNAIRE - PHQ9
SUM OF ALL RESPONSES TO PHQ QUESTIONS 1-9: 1
SUM OF ALL RESPONSES TO PHQ QUESTIONS 1-9: 1
SUM OF ALL RESPONSES TO PHQ9 QUESTIONS 1 & 2: 1
1. LITTLE INTEREST OR PLEASURE IN DOING THINGS: 0
SUM OF ALL RESPONSES TO PHQ QUESTIONS 1-9: 1
2. FEELING DOWN, DEPRESSED OR HOPELESS: 1
SUM OF ALL RESPONSES TO PHQ QUESTIONS 1-9: 1

## 2023-10-30 NOTE — PROGRESS NOTES
The pt was called 2x for intake. No answer. No voicemail is set up. Kb Brown RN    The pt verified her name and . The pt was called for intake. The pt's Blood sugar was 158. She was fasting. No vs equipment. The pt lost her sister about a month ago. Coordination of Care  1. Have you been to the ER, urgent care clinic since your last visit? Hospitalized since your last visit? No    2. Have you seen or consulted any other health care providers outside of the [address] [place] [address] since your last visit? Include any pap smears or colon screening. No  Does the patient need refills? No    Learning Assessment Complete? no  Depression Screening complete in the past 12 months? yes

## 2023-10-30 NOTE — PROGRESS NOTES
Henry Strickland (: 1990) is a 26 y.o. female, Established patient, Virtual Visit for evaluation of the following chief complaint(s):   Diabetes (Follow up. )       ASSESSMENT/PLAN:  1. Type 2 diabetes mellitus with hyperglycemia, without long-term current use of insulin (HCC)  Controlled, continue with current regimen.  -     glipiZIDE (GLUCOTROL) 5 MG tablet; Take 1 tab po bid 30 min ac meals, Disp-180 tablet, R-1Normal  -     metFORMIN (GLUCOPHAGE) 850 MG tablet; Take 1 tablet by mouth 2 times daily (with meals), Disp-180 tablet, R-1Normal  2. Mixed hyperlipidemia  Discussed diet     Return for follow up F2F in 6 months for DM with LK. SUBJECTIVE/OBJECTIVE:  VV for DM follow up and lab review. DM:  Pt is taking Glipizide BID before meals, Metformin 850 mg BID. Is being more careful with diet. Takes Glipizde when her diet is not as restricted, about 4x/wk. Denies hypoglycemic spells. Checking BG AM regularly: 150s. Shx:  No alcohol  Fhx:  No CAD. Sister DM -  of renal failure (drank heavily, had uncontrolled DM)    Review of Systems     Patient-Reported Vitals  No data recorded     Physical Exam    On this date 10/30/2023 I have spent 22 minutes reviewing previous notes, test results and face to face (virtual) with the patient discussing the diagnosis and importance of compliance with the treatment plan as well as documenting on the day of the visit. Henry Strickland, was evaluated through a synchronous (real-time) audio-video encounter. The patient (or guardian if applicable) is aware that this is a billable service, which includes applicable co-pays. This Virtual Visit was conducted with patient's (and/or legal guardian's) consent. Patient identification was verified, and a caregiver was present when appropriate.    The patient was located at Home: [id number] [place] [address]  Provider was located at Home ([facility]): [place]       Patient identification was verified at

## 2023-10-30 NOTE — PROGRESS NOTES
Discharge call to the pt she verified her name and . The medications ordered today were reviewed with the pt. The pt was allowed time for questions. She was informed the medications were $10 each, no coupon was needed for either medication.  Randall Mcduffie RN

## 2024-05-06 NOTE — PROGRESS NOTES
2024 : Noemi  Ashleigh NEHA Ospina (: 1990) is a 34 y.o. female, established patient, here for evaluation of the following chief complaint(s):  Diabetes (Diabetes fu )     ASSESSMENT/PLAN: Fasting labs in 3-4 months to include lipids, cmp, urine microalb/creat, A1c.  These labs have been ordered today in bold.  Below is the assessment and plan developed based on review of pertinent history, physical exam, labs, studies, and medications.   1. Type 2 diabetes mellitus with hyperglycemia, without long-term current use of insulin (HCC)  -     AMB POC GLUCOSE BLOOD, BY GLUCOSE MONITORING DEVICE  -     Hemoglobin A1C; Future  -     glipiZIDE (GLUCOTROL) 5 MG tablet; Take 1 tab po bid 30 min ac meals, Disp-180 tablet, R-1Normal  -     metFORMIN (GLUCOPHAGE) 850 MG tablet; Take 1 tablet by mouth 2 times daily (with meals), Disp-180 tablet, R-1Normal  -     Lipid Panel; Future  -     Microalbumin / Creatinine Urine Ratio; Future  -     Comprehensive Metabolic Panel; Future  -     Hemoglobin A1C; Future    Return for 2-3 weeks virtual visit review a1c, 3-4 months LK diabetes.    SUBJECTIVE/OBJECTIVE:  HPI fasting since 8 pm last night.  Metformin bid, glipizide ac bid She is preoccupied with her mom's situation.  Mom's in [facility].  Mom fell down and has had 2 surgeries on her leg  She has been taking glipizide bid as prescribed.  Results for orders placed or performed in visit on 24   AMB POC GLUCOSE BLOOD, BY GLUCOSE MONITORING DEVICE   Result Value Ref Range    Glucose,  MG/DL     Wt Readings from Last 3 Encounters:   24 75.2 kg (165 lb 12.8 oz)   23 73.2 kg (161 lb 6.4 oz)   23 70.8 kg (156 lb)   Mom's last surgery was 2 wks ago.  Sugars at home: 203 this morning, 120, 160    Lab Results   Component Value Date    LABA1C 7.0 (H) 2023    LABA1C 10.3 (H) 2023    MALBCR 6 2023    MALBCR 12 2022    CREATININE 0.54 (L) 2023    CREATININE 0.56

## 2024-05-31 ENCOUNTER — HOSPITAL ENCOUNTER (OUTPATIENT)
Facility: HOSPITAL | Age: 34
Setting detail: SPECIMEN
Discharge: HOME | End: 2024-06-03

## 2024-05-31 ENCOUNTER — OFFICE VISIT (OUTPATIENT)
Age: 34
End: 2024-05-31

## 2024-05-31 VITALS
WEIGHT: 165.8 LBS | BODY MASS INDEX: 33.2 KG/M2 | TEMPERATURE: 98.1 F | DIASTOLIC BLOOD PRESSURE: 82 MMHG | OXYGEN SATURATION: 95 % | HEART RATE: 79 BPM | SYSTOLIC BLOOD PRESSURE: 129 MMHG

## 2024-05-31 DIAGNOSIS — E11.65 TYPE 2 DIABETES MELLITUS WITH HYPERGLYCEMIA, WITHOUT LONG-TERM CURRENT USE OF INSULIN (HCC): Primary | ICD-10-CM

## 2024-05-31 DIAGNOSIS — E11.65 TYPE 2 DIABETES MELLITUS WITH HYPERGLYCEMIA, WITHOUT LONG-TERM CURRENT USE OF INSULIN (HCC): ICD-10-CM

## 2024-05-31 LAB
EST. AVERAGE GLUCOSE BLD GHB EST-MCNC: 163 MG/DL
GLUCOSE, POC: 169 MG/DL
HBA1C MFR BLD: 7.3 % (ref 4–5.6)

## 2024-05-31 PROCEDURE — 82962 GLUCOSE BLOOD TEST: CPT | Performed by: NURSE PRACTITIONER

## 2024-05-31 PROCEDURE — 36415 COLL VENOUS BLD VENIPUNCTURE: CPT

## 2024-05-31 PROCEDURE — 99213 OFFICE O/P EST LOW 20 MIN: CPT | Performed by: NURSE PRACTITIONER

## 2024-05-31 PROCEDURE — 83036 HEMOGLOBIN GLYCOSYLATED A1C: CPT

## 2024-05-31 RX ORDER — GLIPIZIDE 5 MG/1
TABLET ORAL
Qty: 180 TABLET | Refills: 1 | Status: SHIPPED | OUTPATIENT
Start: 2024-05-31

## 2024-05-31 SDOH — SOCIAL STABILITY: SOCIAL NETWORK: ARE YOU MARRIED, WIDOWED, DIVORCED, SEPARATED, NEVER MARRIED, OR LIVING WITH A PARTNER?: LIVING WITH PARTNER

## 2024-05-31 SDOH — HEALTH STABILITY: MENTAL HEALTH: HOW OFTEN DO YOU HAVE A DRINK CONTAINING ALCOHOL?: NEVER

## 2024-05-31 SDOH — HEALTH STABILITY: PHYSICAL HEALTH: ON AVERAGE, HOW MANY DAYS PER WEEK DO YOU ENGAGE IN MODERATE TO STRENUOUS EXERCISE (LIKE A BRISK WALK)?: 3 DAYS

## 2024-05-31 SDOH — SOCIAL STABILITY: SOCIAL NETWORK: HOW OFTEN DO YOU ATTEND CHURCH OR RELIGIOUS SERVICES?: PATIENT UNABLE TO ANSWER

## 2024-05-31 SDOH — HEALTH STABILITY: MENTAL HEALTH
STRESS IS WHEN SOMEONE FEELS TENSE, NERVOUS, ANXIOUS, OR CAN'T SLEEP AT NIGHT BECAUSE THEIR MIND IS TROUBLED. HOW STRESSED ARE YOU?: TO SOME EXTENT

## 2024-05-31 SDOH — SOCIAL STABILITY: SOCIAL NETWORK
DO YOU BELONG TO ANY CLUBS OR ORGANIZATIONS SUCH AS CHURCH GROUPS UNIONS, FRATERNAL OR ATHLETIC GROUPS, OR SCHOOL GROUPS?: NO

## 2024-05-31 SDOH — SOCIAL STABILITY: SOCIAL NETWORK
IN A TYPICAL WEEK, HOW MANY TIMES DO YOU TALK ON THE PHONE WITH FAMILY, FRIENDS, OR NEIGHBORS?: MORE THAN THREE TIMES A WEEK

## 2024-05-31 SDOH — HEALTH STABILITY: MENTAL HEALTH: HOW MANY STANDARD DRINKS CONTAINING ALCOHOL DO YOU HAVE ON A TYPICAL DAY?: PATIENT DOES NOT DRINK

## 2024-05-31 SDOH — SOCIAL STABILITY: SOCIAL NETWORK: HOW OFTEN DO YOU GET TOGETHER WITH FRIENDS OR RELATIVES?: ONCE A WEEK

## 2024-05-31 SDOH — SOCIAL STABILITY: SOCIAL NETWORK: HOW OFTEN DO YOU ATTENT MEETINGS OF THE CLUB OR ORGANIZATION YOU BELONG TO?: NEVER

## 2024-05-31 SDOH — HEALTH STABILITY: PHYSICAL HEALTH: ON AVERAGE, HOW MANY MINUTES DO YOU ENGAGE IN EXERCISE AT THIS LEVEL?: 10 MIN

## 2024-05-31 ASSESSMENT — PATIENT HEALTH QUESTIONNAIRE - PHQ9
SUM OF ALL RESPONSES TO PHQ9 QUESTIONS 1 & 2: 0
SUM OF ALL RESPONSES TO PHQ QUESTIONS 1-9: 0
2. FEELING DOWN, DEPRESSED OR HOPELESS: NOT AT ALL
1. LITTLE INTEREST OR PLEASURE IN DOING THINGS: NOT AT ALL
SUM OF ALL RESPONSES TO PHQ QUESTIONS 1-9: 0

## 2024-05-31 NOTE — PROGRESS NOTES
Chief Complaint   Patient presents with    Diabetes     Diabetes fu      Patient name and date of birth verified.  Patient given an after visit summary, reviewed medications on how and when to take, coupons given to present to pharmacy for medication discount.  Advised to schedule next appointment before leaving clinic office.  Patient verbalized understanding of all information given at time of visit. Shannon Cristina LPN

## 2024-05-31 NOTE — PROGRESS NOTES
\"Have you been to the ER, urgent care clinic since your last visit?  Hospitalized since your last visit?\"    NO    “Have you seen or consulted any other health care providers outside of [facility] since your last visit?”    NO     “Have you had a pap smear?”    NO    No cervical cancer screening on file         Results for orders placed or performed in visit on 05/31/24   AMB POC GLUCOSE BLOOD, BY GLUCOSE MONITORING DEVICE   Result Value Ref Range    Glucose,  MG/DL        Click Here for Release of Records Request

## 2024-06-11 ENCOUNTER — TELEMEDICINE (OUTPATIENT)
Age: 34
End: 2024-06-11

## 2024-06-11 DIAGNOSIS — E11.9 TYPE 2 DIABETES MELLITUS WITHOUT COMPLICATION, WITHOUT LONG-TERM CURRENT USE OF INSULIN (HCC): Primary | ICD-10-CM

## 2024-06-11 PROCEDURE — 99441 PR PHYS/QHP TELEPHONE EVALUATION 5-10 MIN: CPT | Performed by: FAMILY MEDICINE

## 2024-06-11 SDOH — ECONOMIC STABILITY: FOOD INSECURITY: WITHIN THE PAST 12 MONTHS, THE FOOD YOU BOUGHT JUST DIDN'T LAST AND YOU DIDN'T HAVE MONEY TO GET MORE.: NEVER TRUE

## 2024-06-11 SDOH — ECONOMIC STABILITY: FOOD INSECURITY: WITHIN THE PAST 12 MONTHS, YOU WORRIED THAT YOUR FOOD WOULD RUN OUT BEFORE YOU GOT MONEY TO BUY MORE.: NEVER TRUE

## 2024-06-11 ASSESSMENT — PATIENT HEALTH QUESTIONNAIRE - PHQ9
1. LITTLE INTEREST OR PLEASURE IN DOING THINGS: NOT AT ALL
SUM OF ALL RESPONSES TO PHQ QUESTIONS 1-9: 0
2. FEELING DOWN, DEPRESSED OR HOPELESS: NOT AT ALL
SUM OF ALL RESPONSES TO PHQ9 QUESTIONS 1 & 2: 0
SUM OF ALL RESPONSES TO PHQ QUESTIONS 1-9: 0

## 2024-06-11 ASSESSMENT — SOCIAL DETERMINANTS OF HEALTH (SDOH)

## 2024-06-11 NOTE — PROGRESS NOTES
Ashleigh Ospina (: 1990) is a 34 y.o. female, Established patient, Virtual Visit for evaluation of the following chief complaint(s):   Diabetes (Follow up)       ASSESSMENT/PLAN:  1. Type 2 diabetes mellitus without complication, without long-term current use of insulin (HCC)  Not as well controlled.  She plans on becoming more active during the summer: walking more and so will continue with current regimen.    Return for follow up as scheduled with Chalo.    SUBJECTIVE/OBJECTIVE:  VV for follow up on DM  DM:  Patient is taking Glipizide BID, Metformin BID.  She has gained 4-5 lbs over the winter and admits worrying about her mother's health does distract from her health.     Review of Systems     Patient-Reported Vitals  No data recorded     Physical Exam    On this date 2024 I have spent 8 minutes reviewing previous notes, test results and face to face (virtual) with the patient discussing the diagnosis and importance of compliance with the treatment plan as well as documenting on the day of the visit.  Ashleigh Ospina, was evaluated through a synchronous (real-time) audio-video encounter. The patient (or guardian if applicable) is aware that this is a billable service, which includes applicable co-pays. This Virtual Visit was conducted with patient's (and/or legal guardian's) consent. Patient identification was verified, and a caregiver was present when appropriate.   The patient was located at Home: [address] [place] [id number]  Provider was located at Home [place]  Confirm you are appropriately licensed, registered, or certified to deliver care in the state where the patient is located as indicated above. If you are not or unsure, please re-schedule the visit: Yes, I confirm.      Patient identification was verified at the start of the visit: yes    Services were provided through a phone synchronous discussion virtually to substitute for in-person clinic visit.

## 2024-06-11 NOTE — PROGRESS NOTES
Chief Complaint   Patient presents with    Diabetes     Follow up     \"Have you been to the ER, urgent care clinic since your last visit?  Hospitalized since your last visit?\"    NO    “Have you seen or consulted any other health care providers outside of [facility] since your last visit?”    NO     “Have you had a pap smear?”    YES - [facility].    No cervical cancer screening on file             Click Here for Release of Records Request

## 2024-06-11 NOTE — PROGRESS NOTES
RN checked in with patient after her virtual visit. Name and  confirmed. No further questions at this time.  details per consult note.

## 2024-08-22 NOTE — PROGRESS NOTES
9/3/2024 : AMN Ashleigh Ospina (: 1990) is a 34 y.o. female, established patient, here for evaluation of the following chief complaint(s):  Diabetes Care Management     ASSESSMENT/PLAN: Will stop the morning glipizide, as she has been physically active in the mornings.  Checking labs today.  Below is the assessment and plan developed based on review of pertinent history, physical exam, labs, studies, and medications.   1. Type 2 diabetes mellitus without complication, without long-term current use of insulin (HCC)  -     AMB POC GLUCOSE BLOOD, BY GLUCOSE MONITORING DEVICE  -     Microalbumin / Creatinine Urine Ratio; Future  -     Comprehensive Metabolic Panel; Future  -     Hemoglobin A1C; Future  -     glipiZIDE (GLUCOTROL) 5 MG tablet; Take 1 tab po bid 30 min ac dinner, Disp-90 tablet, R-1Normal  -     metFORMIN (GLUCOPHAGE) 850 MG tablet; Take 1 tablet by mouth 2 times daily (with meals), Disp-180 tablet, R-1Normal    Return for Dr. Chalo Duncan virtual appt 2 wks results, diabetes management.  SUBJECTIVE/OBJECTIVE:  HPI ate 8 pm yesterday.  Reports glucoses go low at 4 pm sometimes.  Review of Systems Negative for fever and unexpected weight change, shortness of breath, leg swelling, abdominal pain, myalgias, and dizziness.   Endocrine:  no polyuria or polydipsia, no chest pain, dyspnea or TIA's, no numbness, tingling or pain in extremities, no unusual visual symptoms, no hypoglycemia, no medication side effects noted.  Results for orders placed or performed in visit on 24   AMB POC GLUCOSE BLOOD, BY GLUCOSE MONITORING DEVICE   Result Value Ref Range    Glucose,  MG/DL   Had w[employer] sandwich  Sometimes hypoglycemic symptoms  Wt Readings from Last 3 Encounters:   24 75.8 kg (167 lb 3.2 oz)   24 75.2 kg (165 lb 12.8 oz)   23 73.2 kg (161 lb 6.4 oz)       Lab Results   Component Value Date    LABA1C 7.3 (H) 2024    LABA1C 7.0 (H) 2023    LABA1C

## 2024-09-03 ENCOUNTER — HOSPITAL ENCOUNTER (OUTPATIENT)
Facility: HOSPITAL | Age: 34
Setting detail: SPECIMEN
Discharge: HOME | End: 2024-09-06

## 2024-09-03 ENCOUNTER — OFFICE VISIT (OUTPATIENT)
Age: 34
End: 2024-09-03

## 2024-09-03 VITALS
HEIGHT: 59 IN | HEART RATE: 87 BPM | TEMPERATURE: 99.1 F | BODY MASS INDEX: 33.71 KG/M2 | OXYGEN SATURATION: 96 % | WEIGHT: 167.2 LBS | DIASTOLIC BLOOD PRESSURE: 85 MMHG | SYSTOLIC BLOOD PRESSURE: 123 MMHG

## 2024-09-03 DIAGNOSIS — E11.65 TYPE 2 DIABETES MELLITUS WITH HYPERGLYCEMIA, WITHOUT LONG-TERM CURRENT USE OF INSULIN (HCC): ICD-10-CM

## 2024-09-03 DIAGNOSIS — E11.9 TYPE 2 DIABETES MELLITUS WITHOUT COMPLICATION, WITHOUT LONG-TERM CURRENT USE OF INSULIN (HCC): Primary | ICD-10-CM

## 2024-09-03 DIAGNOSIS — E11.9 TYPE 2 DIABETES MELLITUS WITHOUT COMPLICATION, WITHOUT LONG-TERM CURRENT USE OF INSULIN (HCC): ICD-10-CM

## 2024-09-03 LAB — GLUCOSE, POC: 111 MG/DL

## 2024-09-03 PROCEDURE — 82570 ASSAY OF URINE CREATININE: CPT

## 2024-09-03 PROCEDURE — 82043 UR ALBUMIN QUANTITATIVE: CPT

## 2024-09-03 PROCEDURE — 80061 LIPID PANEL: CPT

## 2024-09-03 PROCEDURE — 83036 HEMOGLOBIN GLYCOSYLATED A1C: CPT

## 2024-09-03 PROCEDURE — 99213 OFFICE O/P EST LOW 20 MIN: CPT | Performed by: NURSE PRACTITIONER

## 2024-09-03 PROCEDURE — 82962 GLUCOSE BLOOD TEST: CPT | Performed by: NURSE PRACTITIONER

## 2024-09-03 PROCEDURE — 3051F HG A1C>EQUAL 7.0%<8.0%: CPT | Performed by: NURSE PRACTITIONER

## 2024-09-03 PROCEDURE — 80053 COMPREHEN METABOLIC PANEL: CPT

## 2024-09-03 RX ORDER — GLIPIZIDE 5 MG/1
TABLET ORAL
Qty: 90 TABLET | Refills: 1 | Status: SHIPPED | OUTPATIENT
Start: 2024-09-03

## 2024-09-03 SDOH — ECONOMIC STABILITY: INCOME INSECURITY: HOW HARD IS IT FOR YOU TO PAY FOR THE VERY BASICS LIKE FOOD, HOUSING, MEDICAL CARE, AND HEATING?: NOT VERY HARD

## 2024-09-03 SDOH — ECONOMIC STABILITY: FOOD INSECURITY: WITHIN THE PAST 12 MONTHS, THE FOOD YOU BOUGHT JUST DIDN'T LAST AND YOU DIDN'T HAVE MONEY TO GET MORE.: NEVER TRUE

## 2024-09-03 SDOH — ECONOMIC STABILITY: FOOD INSECURITY: WITHIN THE PAST 12 MONTHS, YOU WORRIED THAT YOUR FOOD WOULD RUN OUT BEFORE YOU GOT MONEY TO BUY MORE.: NEVER TRUE

## 2024-09-03 ASSESSMENT — PATIENT HEALTH QUESTIONNAIRE - PHQ9
SUM OF ALL RESPONSES TO PHQ9 QUESTIONS 1 & 2: 0
SUM OF ALL RESPONSES TO PHQ QUESTIONS 1-9: 0
2. FEELING DOWN, DEPRESSED OR HOPELESS: NOT AT ALL
SUM OF ALL RESPONSES TO PHQ QUESTIONS 1-9: 0
1. LITTLE INTEREST OR PLEASURE IN DOING THINGS: NOT AT ALL

## 2024-09-03 ASSESSMENT — SOCIAL DETERMINANTS OF HEALTH (SDOH)

## 2024-09-04 LAB
ALBUMIN SERPL-MCNC: 4.1 G/DL (ref 3.5–5)
ALBUMIN/GLOB SERPL: 1.1 (ref 1.1–2.2)
ALP SERPL-CCNC: 97 U/L (ref 45–117)
ALT SERPL-CCNC: 38 U/L (ref 12–78)
ANION GAP SERPL CALC-SCNC: 7 MMOL/L (ref 5–15)
AST SERPL-CCNC: 26 U/L (ref 15–37)
BILIRUB SERPL-MCNC: 0.4 MG/DL (ref 0.2–1)
BUN SERPL-MCNC: 8 MG/DL (ref 6–20)
BUN/CREAT SERPL: 15 (ref 12–20)
CALCIUM SERPL-MCNC: 9.7 MG/DL (ref 8.5–10.1)
CHLORIDE SERPL-SCNC: 101 MMOL/L (ref 97–108)
CHOLEST SERPL-MCNC: 222 MG/DL
CO2 SERPL-SCNC: 25 MMOL/L (ref 21–32)
CREAT SERPL-MCNC: 0.55 MG/DL (ref 0.55–1.02)
CREAT UR-MCNC: 35.8 MG/DL
EST. AVERAGE GLUCOSE BLD GHB EST-MCNC: 186 MG/DL
GLOBULIN SER CALC-MCNC: 3.9 G/DL (ref 2–4)
GLUCOSE SERPL-MCNC: 199 MG/DL (ref 65–100)
HBA1C MFR BLD: 8.1 % (ref 4–5.6)
HDLC SERPL-MCNC: 43 MG/DL
HDLC SERPL: 5.2 (ref 0–5)
LDLC SERPL CALC-MCNC: 126.4 MG/DL (ref 0–100)
MICROALBUMIN UR-MCNC: <0.5 MG/DL
MICROALBUMIN/CREAT UR-RTO: <14 MG/G (ref 0–30)
POTASSIUM SERPL-SCNC: 4.7 MMOL/L (ref 3.5–5.1)
PROT SERPL-MCNC: 8 G/DL (ref 6.4–8.2)
SODIUM SERPL-SCNC: 133 MMOL/L (ref 136–145)
TRIGL SERPL-MCNC: 263 MG/DL
VLDLC SERPL CALC-MCNC: 52.6 MG/DL

## 2024-09-04 NOTE — RESULT ENCOUNTER NOTE
Has follow up with you on 9/17. Cholesterol elevated, consider dietary and lifestyle changes.  A1c 8.3, has increased from 7.3 in May 2024.  No proteinuria.

## 2024-09-17 ENCOUNTER — TELEMEDICINE (OUTPATIENT)
Age: 34
End: 2024-09-17

## 2024-09-17 DIAGNOSIS — E11.9 TYPE 2 DIABETES MELLITUS WITHOUT COMPLICATION, WITHOUT LONG-TERM CURRENT USE OF INSULIN (HCC): Primary | ICD-10-CM

## 2024-09-17 PROCEDURE — 99442 PR PHYS/QHP TELEPHONE EVALUATION 11-20 MIN: CPT | Performed by: FAMILY MEDICINE

## 2024-09-17 ASSESSMENT — PATIENT HEALTH QUESTIONNAIRE - PHQ9
SUM OF ALL RESPONSES TO PHQ9 QUESTIONS 1 & 2: 0
SUM OF ALL RESPONSES TO PHQ QUESTIONS 1-9: 0
SUM OF ALL RESPONSES TO PHQ QUESTIONS 1-9: 0
2. FEELING DOWN, DEPRESSED OR HOPELESS: NOT AT ALL
SUM OF ALL RESPONSES TO PHQ QUESTIONS 1-9: 0
1. LITTLE INTEREST OR PLEASURE IN DOING THINGS: NOT AT ALL
SUM OF ALL RESPONSES TO PHQ QUESTIONS 1-9: 0

## 2024-10-10 NOTE — PROGRESS NOTES
Nancy L Debra presents to clinic today for follow up. Medications reviewed and education completed.  Patient reports CP that is persistent, describes it as squeezing/santiago pain, occurs at rest and with activity.  Reports she can get SOB at times, but also reports anxiety.  Patient denies palpitations and lightheaded/dizziness.    Weight today: 200 (down 2lbs)     \"Have you been to the ER, urgent care clinic since your last visit?  Hospitalized since your last visit?\"    NO    “Have you seen or consulted any other health care providers outside of [facility] since your last visit?”    NO     “Have you had a pap smear?”    NO Due this year.     No cervical cancer screening on file       Chief Complaint   Patient presents with    Diabetes Care Management     /85 (Site: Right Upper Arm, Position: Sitting, Cuff Size: Large Adult)   Pulse 87   Temp 99.1 °F (37.3 °C) (Temporal)   Ht 1.5 m (4' 11.06\")   Wt 75.8 kg (167 lb 3.2 oz)   LMP 08/30/2024 (Exact Date)   SpO2 96%   BMI 33.71 kg/m²   Results for orders placed or performed in visit on 09/03/24   AMB POC GLUCOSE BLOOD, BY GLUCOSE MONITORING DEVICE   Result Value Ref Range    Glucose,  MG/DL               Click Here for Release of Records Request

## 2025-01-27 NOTE — PROGRESS NOTES
2025 :  # [id number]  Session code: 48226   Ashleigh Ospina (: 1990) is a 34 y.o. female, established patient, here for evaluation of the following chief complaint(s):  Diabetes (3 month f/u)  ASSESSMENT/PLAN: Measure A1c today. BP at goal.  Below is the assessment and plan developed based on review of pertinent history, physical exam, labs, studies, and medications.   1. Type 2 diabetes mellitus without complication, without long-term current use of insulin (HCC)  -     AMB POC GLUCOSE BLOOD, BY GLUCOSE MONITORING DEVICE  -     Hemoglobin A1C; Future  -     glipiZIDE (GLUCOTROL) 5 MG tablet; Take 1 tab po bid 30 min ac dinner, Disp-90 tablet, R-1Normal  -     metFORMIN (GLUCOPHAGE) 850 MG tablet; Take 1 tablet by mouth 2 times daily (with meals), Disp-180 tablet, R-1Normal    Return for 4 wks LK results or can be Dr. Duncan, med adjustments.  SUBJECTIVE/OBJECTIVE:  HPI sometimes misses the evening glipizide medicine.  Review of Systems Negative for fever and unexpected weight change, shortness of breath, leg swelling, abdominal pain, myalgias, dizziness. Negative for polyuria/polydipsia, chest pain, dyspnea, TIA's, numbness/tingling/pain in extremities, unusual visual symptoms, hypoglycemia symptoms, medication side effects.  Results for orders placed or performed in visit on 25   AMB POC GLUCOSE BLOOD, BY GLUCOSE MONITORING DEVICE   Result Value Ref Range    Glucose,  MG/DL   Ate at 8 pm yesterday.  Albumin/Creatinine Ratio   Date Value Ref Range Status   2024 <14 0 - 30 mg/g Final     Lab Results   Component Value Date    LABA1C 8.1 (H) 2024    LABA1C 7.3 (H) 2024    LABGLOM >90 2024    CREATININE 0.55 2024    ALT 38 2024    AST 26 2024    ALKPHOS 97 2024    BILITOT 0.4 2024    CHOL 222 (H) 2024    TRIG 263 (H) 2024    HDL 43 2024    .4 (H) 2024    CHOLHDLRATIO 5.2 (H) 2024   The [facility]

## 2025-01-31 ENCOUNTER — HOSPITAL ENCOUNTER (OUTPATIENT)
Facility: HOSPITAL | Age: 35
Setting detail: SPECIMEN
Discharge: HOME | End: 2025-02-03

## 2025-01-31 ENCOUNTER — OFFICE VISIT (OUTPATIENT)
Age: 35
End: 2025-01-31

## 2025-01-31 VITALS
BODY MASS INDEX: 33.06 KG/M2 | TEMPERATURE: 98.6 F | DIASTOLIC BLOOD PRESSURE: 69 MMHG | SYSTOLIC BLOOD PRESSURE: 116 MMHG | HEART RATE: 81 BPM | OXYGEN SATURATION: 97 % | WEIGHT: 164 LBS

## 2025-01-31 DIAGNOSIS — E11.9 TYPE 2 DIABETES MELLITUS WITHOUT COMPLICATION, WITHOUT LONG-TERM CURRENT USE OF INSULIN (HCC): ICD-10-CM

## 2025-01-31 DIAGNOSIS — E11.9 TYPE 2 DIABETES MELLITUS WITHOUT COMPLICATION, WITHOUT LONG-TERM CURRENT USE OF INSULIN (HCC): Primary | ICD-10-CM

## 2025-01-31 LAB
EST. AVERAGE GLUCOSE BLD GHB EST-MCNC: 258 MG/DL
GLUCOSE, POC: 286 MG/DL
HBA1C MFR BLD: 10.6 % (ref 4–5.6)

## 2025-01-31 PROCEDURE — 83036 HEMOGLOBIN GLYCOSYLATED A1C: CPT

## 2025-01-31 RX ORDER — GLIPIZIDE 5 MG/1
TABLET ORAL
Qty: 90 TABLET | Refills: 1 | Status: SHIPPED | OUTPATIENT
Start: 2025-01-31

## 2025-01-31 SDOH — ECONOMIC STABILITY: TRANSPORTATION INSECURITY
IN THE PAST 12 MONTHS, HAS LACK OF TRANSPORTATION KEPT YOU FROM MEETINGS, WORK, OR FROM GETTING THINGS NEEDED FOR DAILY LIVING?: NO

## 2025-01-31 SDOH — ECONOMIC STABILITY: INCOME INSECURITY: IN THE LAST 12 MONTHS, WAS THERE A TIME WHEN YOU WERE NOT ABLE TO PAY THE MORTGAGE OR RENT ON TIME?: NO

## 2025-01-31 ASSESSMENT — PATIENT HEALTH QUESTIONNAIRE - PHQ9
SUM OF ALL RESPONSES TO PHQ QUESTIONS 1-9: 0
1. LITTLE INTEREST OR PLEASURE IN DOING THINGS: NOT AT ALL
SUM OF ALL RESPONSES TO PHQ QUESTIONS 1-9: 0
SUM OF ALL RESPONSES TO PHQ9 QUESTIONS 1 & 2: 0
SUM OF ALL RESPONSES TO PHQ QUESTIONS 1-9: 0
SUM OF ALL RESPONSES TO PHQ QUESTIONS 1-9: 0
2. FEELING DOWN, DEPRESSED OR HOPELESS: NOT AT ALL

## 2025-01-31 NOTE — PROGRESS NOTES
\"Have you been to the ER, urgent care clinic since your last visit?  Hospitalized since your last visit?\"    NO    “Have you seen or consulted any other health care providers outside our system since your last visit?”    NO     “Have you had a pap smear?”    NO    No cervical cancer screening on file       “Have you had a diabetic eye exam?”    NO     Date of last diabetic eye exam: 7/31/2020          Results for orders placed or performed in visit on 01/31/25   AMB POC GLUCOSE BLOOD, BY GLUCOSE MONITORING DEVICE   Result Value Ref Range    Glucose,  MG/DL   fasting

## 2025-01-31 NOTE — PROGRESS NOTES
Ashleigh Ospina seen at discharge. Full name and  verified; After visit Summary was given and reviewed with patient. RN advised patient when provider recommends to return for follow-up visit in 4 weeks. RN reviewed the provider's instructions with the patient, including medication instructions. Patient verbalized her understanding and denies having any further questions at this time.      PELON PANDEY RN

## 2025-02-03 ENCOUNTER — TELEPHONE (OUTPATIENT)
Age: 35
End: 2025-02-03

## 2025-02-03 NOTE — TELEPHONE ENCOUNTER
2/3/2025  Session code: [id number] Left message.  A1c 10.6, up from 8.3.  Focus on eating more nonstarchy vegetables, less carbohydrates, and increasing physical activity along with taking your medications.  Your next follow up is in March.  Deborah Isabel, FABI, FNP-BC, BC-ADM  Board Certified in Advanced Diabetes Management

## 2025-03-25 ENCOUNTER — HOSPITAL ENCOUNTER (OUTPATIENT)
Facility: HOSPITAL | Age: 35
Setting detail: SPECIMEN
Discharge: HOME | End: 2025-03-28

## 2025-03-25 DIAGNOSIS — E11.9 TYPE 2 DIABETES MELLITUS WITHOUT COMPLICATION, WITHOUT LONG-TERM CURRENT USE OF INSULIN: ICD-10-CM

## 2025-03-25 LAB
EST. AVERAGE GLUCOSE BLD GHB EST-MCNC: 232 MG/DL
HBA1C MFR BLD: 9.7 % (ref 4–5.6)

## 2025-03-25 PROCEDURE — 36415 COLL VENOUS BLD VENIPUNCTURE: CPT

## 2025-03-25 PROCEDURE — 83036 HEMOGLOBIN GLYCOSYLATED A1C: CPT

## 2025-03-26 ENCOUNTER — RESULTS FOLLOW-UP (OUTPATIENT)
Age: 35
End: 2025-03-26

## 2025-04-15 ENCOUNTER — TELEMEDICINE (OUTPATIENT)
Age: 35
End: 2025-04-15

## 2025-04-15 DIAGNOSIS — E11.9 TYPE 2 DIABETES MELLITUS WITHOUT COMPLICATION, WITHOUT LONG-TERM CURRENT USE OF INSULIN: Primary | ICD-10-CM

## 2025-04-15 PROCEDURE — 99212 OFFICE O/P EST SF 10 MIN: CPT | Performed by: FAMILY MEDICINE

## 2025-04-15 NOTE — PROGRESS NOTES
Ashleigh Ospina was seen at discharge. Patient verified their full name and . After visit Summary provided and reviewed with patient. RN advised patient when provider recommends to return for follow-up visit in 3 months. RN reviewed the provider's instructions with the patient, including medication instructions. Patient verbalized her understanding and denies having any further questions at this time.    Jase Cummins RN

## 2025-04-15 NOTE — PROGRESS NOTES
Chief Complaint   Patient presents with    Discuss Labs     Patient prefers telephone call     \"Have you been to the ER, urgent care clinic since your last visit?  Hospitalized since your last visit?\"    NO    “Have you seen or consulted any other health care providers outside our system since your last visit?”    NO     “Have you had a pap smear?”    NO    No cervical cancer screening on file       “Have you had a diabetic eye exam?”    NO     Date of last diabetic eye exam: 7/31/2020     Jase Cummins RN

## 2025-04-15 NOTE — PROGRESS NOTES
Ashleigh Ospina (: 1990) is a 35 y.o. female, Established patient, Virtual Visit for evaluation of the following chief complaint(s):   Discuss Labs       ASSESSMENT/PLAN:  1. Type 2 diabetes mellitus without complication, without long-term current use of insulin  Improved but not controlled.  Her anxiety and diet are improving.  She is tolerating higher dose of Metformin.  Discussed options.  She is planning on walking regularly now that the weather permits.  Will continue current dose and recheck in 3 months.  Could consider changing to Januvia since Glipizide can increase appetite.    Return for follow up F2F in 3 months for DM.    SUBJECTIVE/OBJECTIVE:  VV for DM follow up.  DM:  Patient is taking Glipizide BID, Metformin BID regularly.  Was having more anxiety to eat sweets.  This was worse when immigration worries.  Denies hypoglycemic spells.  Checks BG sporadically: 180.    Review of Systems     Patient-Reported Vitals  No data recorded     Physical Exam    On this date 4/15/2025 I have spent 11 minutes reviewing previous notes, test results and face to face (virtual) with the patient discussing the diagnosis and importance of compliance with the treatment plan as well as documenting on the day of the visit.  Ashleigh Ospina, was evaluated through a synchronous (real-time) audio encounter. The patient (or guardian if applicable) is aware that this is a billable service, which includes applicable co-pays. This Virtual Visit was conducted with patient's (and/or legal guardian's) consent. Patient identification was verified, and a caregiver was present when appropriate.   The patient was located at Home: [address] [place] [address]  Provider was located at Home [place]  Confirm you are appropriately licensed, registered, or certified to deliver care in the state where the patient is located as indicated above. If you are not or unsure, please re-schedule the

## 2025-05-17 DIAGNOSIS — E11.9 TYPE 2 DIABETES MELLITUS WITHOUT COMPLICATION, WITHOUT LONG-TERM CURRENT USE OF INSULIN (HCC): ICD-10-CM

## 2025-05-19 DIAGNOSIS — E11.9 TYPE 2 DIABETES MELLITUS WITHOUT COMPLICATION, WITHOUT LONG-TERM CURRENT USE OF INSULIN (HCC): ICD-10-CM

## 2025-05-20 ENCOUNTER — HOSPITAL ENCOUNTER (INPATIENT)
Facility: HOSPITAL | Age: 35
LOS: 2 days | Discharge: HOME | DRG: 419 | End: 2025-05-22
Attending: EMERGENCY MEDICINE | Admitting: INTERNAL MEDICINE
Payer: MEDICAID

## 2025-05-20 ENCOUNTER — APPOINTMENT (OUTPATIENT)
Facility: HOSPITAL | Age: 35
DRG: 419 | End: 2025-05-20
Payer: MEDICAID

## 2025-05-20 DIAGNOSIS — R79.89 ELEVATED LFTS: ICD-10-CM

## 2025-05-20 DIAGNOSIS — K80.70 CHOLELITHIASIS WITH CHOLEDOCHOLITHIASIS: Primary | ICD-10-CM

## 2025-05-20 DIAGNOSIS — G89.18 ACUTE POST-OPERATIVE PAIN: ICD-10-CM

## 2025-05-20 PROBLEM — R74.01 TRANSAMINITIS: Status: ACTIVE | Noted: 2025-05-20

## 2025-05-20 LAB
-: NORMAL
ALBUMIN SERPL-MCNC: 4.4 G/DL (ref 3.5–5.2)
ALBUMIN/GLOB SERPL: 1.3 (ref 1.1–2.2)
ALP SERPL-CCNC: 200 U/L (ref 35–104)
ALT SERPL-CCNC: 684 U/L (ref 10–35)
ANION GAP SERPL CALC-SCNC: 14 MMOL/L (ref 2–12)
APAP SERPL-MCNC: 7 UG/ML (ref 10–30)
APPEARANCE UR: CLEAR
AST SERPL-CCNC: 598 U/L (ref 10–35)
BACTERIA URNS QL MICRO: NEGATIVE /HPF
BASOPHILS # BLD: 0.03 K/UL (ref 0–0.1)
BASOPHILS NFR BLD: 0.4 % (ref 0–1)
BILIRUB SERPL-MCNC: 4.4 MG/DL (ref 0.2–1)
BILIRUB UR QL CFM: POSITIVE
BUN SERPL-MCNC: 4 MG/DL (ref 6–20)
BUN/CREAT SERPL: ABNORMAL (ref 12–20)
CALCIUM SERPL-MCNC: 9.3 MG/DL (ref 8.6–10)
CHLORIDE SERPL-SCNC: 98 MMOL/L (ref 98–107)
CO2 SERPL-SCNC: 21 MMOL/L (ref 22–29)
COLOR UR: ABNORMAL
CREAT SERPL-MCNC: <0.47 MG/DL (ref 0.5–0.9)
DIFFERENTIAL METHOD BLD: ABNORMAL
EOSINOPHIL # BLD: 0.09 K/UL (ref 0–0.4)
EOSINOPHIL NFR BLD: 1.3 % (ref 0–7)
EPITH CASTS URNS QL MICRO: ABNORMAL /LPF
ERYTHROCYTE [DISTWIDTH] IN BLOOD BY AUTOMATED COUNT: 11.9 % (ref 11.5–14.5)
GLOBULIN SER CALC-MCNC: 3.4 G/DL (ref 2–4)
GLUCOSE BLD STRIP.AUTO-MCNC: 247 MG/DL (ref 65–117)
GLUCOSE BLD STRIP.AUTO-MCNC: 253 MG/DL (ref 65–117)
GLUCOSE BLD STRIP.AUTO-MCNC: 311 MG/DL (ref 65–117)
GLUCOSE BLD STRIP.AUTO-MCNC: 340 MG/DL (ref 65–117)
GLUCOSE SERPL-MCNC: 378 MG/DL (ref 65–100)
GLUCOSE UR STRIP.AUTO-MCNC: >1000 MG/DL
HAV IGM SER QL: NONREACTIVE
HBV CORE IGM SER QL: NONREACTIVE
HBV SURFACE AG SER QL: <0.1 INDEX
HBV SURFACE AG SER QL: NEGATIVE
HCG UR QL: NEGATIVE
HCT VFR BLD AUTO: 38.9 % (ref 35–47)
HCV AB SER IA-ACNC: 0.03 INDEX
HCV AB SERPL QL IA: NONREACTIVE
HGB BLD-MCNC: 13.8 G/DL (ref 11.5–16)
HGB UR QL STRIP: NEGATIVE
IMM GRANULOCYTES # BLD AUTO: 0.01 K/UL (ref 0–0.04)
IMM GRANULOCYTES NFR BLD AUTO: 0.1 % (ref 0–0.5)
KETONES UR QL STRIP.AUTO: 40 MG/DL
LEUKOCYTE ESTERASE UR QL STRIP.AUTO: NEGATIVE
LIPASE SERPL-CCNC: 38 U/L (ref 13–60)
LYMPHOCYTES # BLD: 2.09 K/UL (ref 0.8–3.5)
LYMPHOCYTES NFR BLD: 29.1 % (ref 12–49)
MCH RBC QN AUTO: 29.1 PG (ref 26–34)
MCHC RBC AUTO-ENTMCNC: 35.5 G/DL (ref 30–36.5)
MCV RBC AUTO: 81.9 FL (ref 80–99)
MONOCYTES # BLD: 0.24 K/UL (ref 0–1)
MONOCYTES NFR BLD: 3.3 % (ref 5–13)
NEUTS SEG # BLD: 4.73 K/UL (ref 1.8–8)
NEUTS SEG NFR BLD: 65.8 % (ref 32–75)
NITRITE UR QL STRIP.AUTO: NEGATIVE
NRBC # BLD: 0 K/UL (ref 0–0.01)
NRBC BLD-RTO: 0 PER 100 WBC
PH UR STRIP: 7.5 (ref 5–8)
PLATELET # BLD AUTO: 300 K/UL (ref 150–400)
PMV BLD AUTO: 9.7 FL (ref 8.9–12.9)
POTASSIUM SERPL-SCNC: 4.1 MMOL/L (ref 3.5–5.1)
PROT SERPL-MCNC: 7.8 G/DL (ref 6.4–8.3)
PROT UR STRIP-MCNC: NEGATIVE MG/DL
RBC # BLD AUTO: 4.75 M/UL (ref 3.8–5.2)
RBC #/AREA URNS HPF: ABNORMAL /HPF
SERVICE CMNT-IMP: ABNORMAL
SODIUM SERPL-SCNC: 133 MMOL/L (ref 136–145)
SP GR UR REFRACTOMETRY: 1.01 (ref 1–1.03)
URINE CULTURE IF INDICATED: ABNORMAL
UROBILINOGEN UR QL STRIP.AUTO: 0.2 EU/DL (ref 0.2–1)
WBC # BLD AUTO: 7.2 K/UL (ref 3.6–11)
WBC URNS QL MICRO: ABNORMAL /HPF (ref 0–4)

## 2025-05-20 PROCEDURE — 80053 COMPREHEN METABOLIC PANEL: CPT

## 2025-05-20 PROCEDURE — 96374 THER/PROPH/DIAG INJ IV PUSH: CPT

## 2025-05-20 PROCEDURE — 80143 DRUG ASSAY ACETAMINOPHEN: CPT

## 2025-05-20 PROCEDURE — 82962 GLUCOSE BLOOD TEST: CPT

## 2025-05-20 PROCEDURE — 81001 URINALYSIS AUTO W/SCOPE: CPT

## 2025-05-20 PROCEDURE — 94761 N-INVAS EAR/PLS OXIMETRY MLT: CPT

## 2025-05-20 PROCEDURE — 6370000000 HC RX 637 (ALT 250 FOR IP): Performed by: INTERNAL MEDICINE

## 2025-05-20 PROCEDURE — 83690 ASSAY OF LIPASE: CPT

## 2025-05-20 PROCEDURE — 1100000000 HC RM PRIVATE

## 2025-05-20 PROCEDURE — 80074 ACUTE HEPATITIS PANEL: CPT

## 2025-05-20 PROCEDURE — 2580000003 HC RX 258: Performed by: INTERNAL MEDICINE

## 2025-05-20 PROCEDURE — 6360000002 HC RX W HCPCS: Performed by: STUDENT IN AN ORGANIZED HEALTH CARE EDUCATION/TRAINING PROGRAM

## 2025-05-20 PROCEDURE — 74177 CT ABD & PELVIS W/CONTRAST: CPT

## 2025-05-20 PROCEDURE — 99285 EMERGENCY DEPT VISIT HI MDM: CPT

## 2025-05-20 PROCEDURE — 81025 URINE PREGNANCY TEST: CPT

## 2025-05-20 PROCEDURE — 36415 COLL VENOUS BLD VENIPUNCTURE: CPT

## 2025-05-20 PROCEDURE — 96375 TX/PRO/DX INJ NEW DRUG ADDON: CPT

## 2025-05-20 PROCEDURE — 2500000003 HC RX 250 WO HCPCS: Performed by: INTERNAL MEDICINE

## 2025-05-20 PROCEDURE — 6360000002 HC RX W HCPCS: Performed by: INTERNAL MEDICINE

## 2025-05-20 PROCEDURE — 6360000004 HC RX CONTRAST MEDICATION: Performed by: EMERGENCY MEDICINE

## 2025-05-20 PROCEDURE — 2580000003 HC RX 258: Performed by: STUDENT IN AN ORGANIZED HEALTH CARE EDUCATION/TRAINING PROGRAM

## 2025-05-20 PROCEDURE — 6360000002 HC RX W HCPCS: Performed by: EMERGENCY MEDICINE

## 2025-05-20 PROCEDURE — 85025 COMPLETE CBC W/AUTO DIFF WBC: CPT

## 2025-05-20 RX ORDER — IOPAMIDOL 755 MG/ML
100 INJECTION, SOLUTION INTRAVASCULAR
Status: COMPLETED | OUTPATIENT
Start: 2025-05-20 | End: 2025-05-20

## 2025-05-20 RX ORDER — POTASSIUM CHLORIDE 750 MG/1
40 TABLET, EXTENDED RELEASE ORAL PRN
Status: DISCONTINUED | OUTPATIENT
Start: 2025-05-20 | End: 2025-05-22 | Stop reason: HOSPADM

## 2025-05-20 RX ORDER — PANTOPRAZOLE SODIUM 40 MG/1
40 TABLET, DELAYED RELEASE ORAL
Status: DISCONTINUED | OUTPATIENT
Start: 2025-05-20 | End: 2025-05-22 | Stop reason: HOSPADM

## 2025-05-20 RX ORDER — SODIUM CHLORIDE 0.9 % (FLUSH) 0.9 %
5-40 SYRINGE (ML) INJECTION EVERY 12 HOURS SCHEDULED
Status: DISCONTINUED | OUTPATIENT
Start: 2025-05-20 | End: 2025-05-22 | Stop reason: HOSPADM

## 2025-05-20 RX ORDER — ONDANSETRON 4 MG/1
4 TABLET, ORALLY DISINTEGRATING ORAL EVERY 8 HOURS PRN
Status: DISCONTINUED | OUTPATIENT
Start: 2025-05-20 | End: 2025-05-22 | Stop reason: HOSPADM

## 2025-05-20 RX ORDER — SODIUM CHLORIDE 9 MG/ML
INJECTION, SOLUTION INTRAVENOUS PRN
Status: DISCONTINUED | OUTPATIENT
Start: 2025-05-20 | End: 2025-05-22 | Stop reason: HOSPADM

## 2025-05-20 RX ORDER — INSULIN LISPRO 100 [IU]/ML
0-8 INJECTION, SOLUTION INTRAVENOUS; SUBCUTANEOUS
Status: DISCONTINUED | OUTPATIENT
Start: 2025-05-20 | End: 2025-05-22 | Stop reason: HOSPADM

## 2025-05-20 RX ORDER — SODIUM CHLORIDE 9 MG/ML
INJECTION, SOLUTION INTRAVENOUS CONTINUOUS
Status: DISPENSED | OUTPATIENT
Start: 2025-05-20 | End: 2025-05-21

## 2025-05-20 RX ORDER — MORPHINE SULFATE 4 MG/ML
4 INJECTION, SOLUTION INTRAMUSCULAR; INTRAVENOUS
Refills: 0 | Status: COMPLETED | OUTPATIENT
Start: 2025-05-20 | End: 2025-05-20

## 2025-05-20 RX ORDER — ONDANSETRON 2 MG/ML
4 INJECTION INTRAMUSCULAR; INTRAVENOUS EVERY 6 HOURS PRN
Status: DISCONTINUED | OUTPATIENT
Start: 2025-05-20 | End: 2025-05-22 | Stop reason: HOSPADM

## 2025-05-20 RX ORDER — ONDANSETRON 2 MG/ML
4 INJECTION INTRAMUSCULAR; INTRAVENOUS ONCE
Status: COMPLETED | OUTPATIENT
Start: 2025-05-20 | End: 2025-05-20

## 2025-05-20 RX ORDER — SODIUM CHLORIDE 0.9 % (FLUSH) 0.9 %
5-40 SYRINGE (ML) INJECTION PRN
Status: DISCONTINUED | OUTPATIENT
Start: 2025-05-20 | End: 2025-05-22 | Stop reason: HOSPADM

## 2025-05-20 RX ORDER — POLYETHYLENE GLYCOL 3350 17 G/17G
17 POWDER, FOR SOLUTION ORAL DAILY PRN
Status: DISCONTINUED | OUTPATIENT
Start: 2025-05-20 | End: 2025-05-22 | Stop reason: HOSPADM

## 2025-05-20 RX ORDER — POTASSIUM CHLORIDE 7.45 MG/ML
10 INJECTION INTRAVENOUS PRN
Status: DISCONTINUED | OUTPATIENT
Start: 2025-05-20 | End: 2025-05-22 | Stop reason: HOSPADM

## 2025-05-20 RX ORDER — MAGNESIUM SULFATE IN WATER 40 MG/ML
2000 INJECTION, SOLUTION INTRAVENOUS PRN
Status: DISCONTINUED | OUTPATIENT
Start: 2025-05-20 | End: 2025-05-22 | Stop reason: HOSPADM

## 2025-05-20 RX ADMIN — ONDANSETRON 4 MG: 2 INJECTION, SOLUTION INTRAMUSCULAR; INTRAVENOUS at 19:48

## 2025-05-20 RX ADMIN — HYDROMORPHONE HYDROCHLORIDE 0.5 MG: 1 INJECTION, SOLUTION INTRAMUSCULAR; INTRAVENOUS; SUBCUTANEOUS at 19:43

## 2025-05-20 RX ADMIN — PIPERACILLIN AND TAZOBACTAM 3375 MG: 3; .375 INJECTION, POWDER, LYOPHILIZED, FOR SOLUTION INTRAVENOUS at 16:57

## 2025-05-20 RX ADMIN — PIPERACILLIN AND TAZOBACTAM 4500 MG: 4; .5 INJECTION, POWDER, FOR SOLUTION INTRAVENOUS at 13:17

## 2025-05-20 RX ADMIN — SODIUM CHLORIDE: 0.9 INJECTION, SOLUTION INTRAVENOUS at 05:39

## 2025-05-20 RX ADMIN — ONDANSETRON 4 MG: 2 INJECTION, SOLUTION INTRAMUSCULAR; INTRAVENOUS at 02:48

## 2025-05-20 RX ADMIN — INSULIN LISPRO 6 UNITS: 100 INJECTION, SOLUTION INTRAVENOUS; SUBCUTANEOUS at 21:20

## 2025-05-20 RX ADMIN — PANTOPRAZOLE SODIUM 40 MG: 40 TABLET, DELAYED RELEASE ORAL at 09:00

## 2025-05-20 RX ADMIN — INSULIN LISPRO 2 UNITS: 100 INJECTION, SOLUTION INTRAVENOUS; SUBCUTANEOUS at 13:17

## 2025-05-20 RX ADMIN — INSULIN LISPRO 6 UNITS: 100 INJECTION, SOLUTION INTRAVENOUS; SUBCUTANEOUS at 16:55

## 2025-05-20 RX ADMIN — IOPAMIDOL 100 ML: 755 INJECTION, SOLUTION INTRAVENOUS at 03:31

## 2025-05-20 RX ADMIN — MORPHINE SULFATE 4 MG: 4 INJECTION INTRAVENOUS at 02:49

## 2025-05-20 RX ADMIN — SODIUM CHLORIDE, PRESERVATIVE FREE 10 ML: 5 INJECTION INTRAVENOUS at 09:04

## 2025-05-20 RX ADMIN — INSULIN LISPRO 4 UNITS: 100 INJECTION, SOLUTION INTRAVENOUS; SUBCUTANEOUS at 09:00

## 2025-05-20 ASSESSMENT — PAIN DESCRIPTION - ORIENTATION
ORIENTATION: MID;LEFT;UPPER
ORIENTATION: RIGHT;UPPER
ORIENTATION: RIGHT

## 2025-05-20 ASSESSMENT — ENCOUNTER SYMPTOMS
BACK PAIN: 0
ABDOMINAL PAIN: 1
VOMITING: 1
COLOR CHANGE: 0
DIARRHEA: 0
NAUSEA: 1
CONSTIPATION: 0
SHORTNESS OF BREATH: 0

## 2025-05-20 ASSESSMENT — PAIN SCALES - GENERAL
PAINLEVEL_OUTOF10: 10
PAINLEVEL_OUTOF10: 0
PAINLEVEL_OUTOF10: 9
PAINLEVEL_OUTOF10: 7

## 2025-05-20 ASSESSMENT — PAIN DESCRIPTION - LOCATION
LOCATION: ABDOMEN;FLANK;BACK
LOCATION: ABDOMEN
LOCATION: ABDOMEN

## 2025-05-20 ASSESSMENT — PAIN DESCRIPTION - DESCRIPTORS: DESCRIPTORS: ACHING;SHARP

## 2025-05-20 ASSESSMENT — PAIN - FUNCTIONAL ASSESSMENT: PAIN_FUNCTIONAL_ASSESSMENT: 0-10

## 2025-05-20 NOTE — CONSULTS
Shila Jean PA-C                       [phone] office             Monday-Friday 8:00 am-4:30 pm  I am not permitted to use \"perfect serve\" use above for contact, thanks.      Gastroenterology Consultation Note      Admit Date: 5/20/2025  Consult Date: 5/20/2025   I greatly appreciate your asking me to see Ashleigh Ospina, thank you very much for the opportunity to participate in her care.    Narrative Assessment and Plan   35-year-old female admitted with cholelithiasis and choledocholithiasis, being evaluated by GI for ERCP.  CT A/P with IV contrast on admission demonstrated stones in the common bile duct as well as in the gallbladder.  , ALT 64, alk phos 200, total bilirubin 4.4.  Lipase 38.  No fever or leukocytosis.    Impression:  Choledocholithiasis  Cholelithiasis    Plan:  ERCP is indicated based on the above imaging and labs.  Due to lack of anesthesiology availability, [facility] is unable to accommodate an ERCP until sometime next week.  We have already spoken with our colleagues at [facility] and ERCP can be accommodated there.  Requested immediate transfer to [facility].  Started Zosyn prophylactically.  Discussed the above plan with the patient and her family at bedside in detail using remote .    Shila Jean PA-C    Addendum: No beds available at [facility]. Anesthesia now available for ERCP tomorrow with Dr. Forte at [facility]. I updated the patient and family at bedside using remote  services.     Subjective:     Chief Complaint: Abdominal pain, nausea, vomiting    History of Present Illness: GI consultation requested by Dr. Dumont for choledocholithiasis.  35-year-old male with past medical history of type 2 diabetes, admitted 5/20/2025 with cholelithiasis and choledocholithiasis.  She has had recurrent epigastric/right upper  100 WBC    nRBC 0.00 0.00 - 0.01 K/uL    Neutrophils % 65.8 32.0 - 75.0 %    Lymphocytes % 29.1 12.0 - 49.0 %    Monocytes % 3.3 (L) 5.0 - 13.0 %    Eosinophils % 1.3 0.0 - 7.0 %    Basophils % 0.4 0.0 - 1.0 %    Immature Granulocytes % 0.1 0 - 0.5 %    Neutrophils Absolute 4.73 1.80 - 8.00 K/UL    Lymphocytes Absolute 2.09 0.80 - 3.50 K/UL    Monocytes Absolute 0.24 0.00 - 1.00 K/UL    Eosinophils Absolute 0.09 0.00 - 0.40 K/UL    Basophils Absolute 0.03 0.00 - 0.10 K/UL    Immature Granulocytes Absolute 0.01 0.00 - 0.04 K/UL    Differential Type AUTOMATED     Lipase    Collection Time: 05/20/25  1:55 AM   Result Value Ref Range    Lipase 38 13 - 60 U/L   Comprehensive Metabolic Panel    Collection Time: 05/20/25  1:55 AM   Result Value Ref Range    Sodium 133 (L) 136 - 145 mmol/L    Potassium 4.1 3.5 - 5.1 mmol/L    Chloride 98 98 - 107 mmol/L    CO2 21 (L) 22 - 29 mmol/L    Anion Gap 14 (H) 2 - 12 mmol/L    Glucose 378 (H) 65 - 100 mg/dL    BUN 4 (L) 6 - 20 MG/DL    Creatinine <0.47 (L) 0.50 - 0.90 MG/DL    BUN/Creatinine Ratio Cannot be calculated 12 - 20      Est, Glom Filt Rate Cannot be calculated >60 ml/min/1.73m2    Calcium 9.3 8.6 - 10.0 MG/DL    Total Bilirubin 4.4 (H) 0.2 - 1.0 MG/DL     (H) 10 - 35 U/L     (H) 10 - 35 U/L    Alk Phosphatase 200 (H) 35 - 104 U/L    Total Protein 7.8 6.4 - 8.3 g/dL    Albumin 4.4 3.5 - 5.2 g/dL    Globulin 3.4 2.0 - 4.0 g/dL    Albumin/Globulin Ratio 1.3 1.1 - 2.2     Urinalysis with Reflex to Culture    Collection Time: 05/20/25  1:59 AM    Specimen: Miscellaneous sample    Urine specimen~Random urine   Result Value Ref Range    Color, UA YELLOW/STRAW      Appearance CLEAR CLEAR      Specific Gravity, UA 1.010 1.003 - 1.030      pH, Urine 7.5 5.0 - 8.0      Protein, UA Negative NEG mg/dL    Glucose, Ur >1000 (A) NEG mg/dL    Ketones, Urine 40 (A) NEG mg/dL    Blood, Urine Negative NEG      Urobilinogen, Urine 0.2 0.2 - 1.0 EU/dL    Nitrite, Urine Negative

## 2025-05-20 NOTE — ED PROVIDER NOTES
[facility] EMERGENCY DEPARTMENT  EMERGENCY DEPARTMENT ENCOUNTER      Pt Name: Ashleigh Ospina  MRN: [id number]  Birthdate 1990  Date of evaluation: 5/20/2025  Provider: Fco Gordillo MD    CHIEF COMPLAINT       Chief Complaint   Patient presents with    Abdominal Pain         HISTORY OF PRESENT ILLNESS   (Location/Symptom, Timing/Onset, Context/Setting, Quality, Duration, Modifying Factors, Severity)  Note limiting factors.   Ashleigh Ospina is a 35 y.o. female who presents to the emergency department      The history is provided by the patient. The history is limited by a language barrier.   Abdominal Pain  Pain location:  Epigastric, RUQ and R flank  Pain quality: aching    Pain radiates to:  R flank  Pain severity:  Severe  Onset quality:  Sudden  Timing:  Constant  Progression:  Unchanged  Chronicity:  Recurrent  Ineffective treatments:  Acetaminophen and NSAIDs  Associated symptoms: nausea and vomiting    Associated symptoms: no chest pain, no chills, no constipation, no diarrhea, no dysuria, no fever, no hematuria and no shortness of breath        Nursing Notes were reviewed.    REVIEW OF SYSTEMS    (2-9 systems for level 4, 10 or more for level 5)     Review of Systems   Constitutional:  Negative for activity change, chills and fever.   HENT:  Negative for nosebleeds.    Eyes:  Negative for visual disturbance.   Respiratory:  Negative for shortness of breath.    Cardiovascular:  Negative for chest pain and palpitations.   Gastrointestinal:  Positive for abdominal pain, nausea and vomiting. Negative for constipation and diarrhea.   Genitourinary:  Negative for difficulty urinating, dysuria, hematuria and urgency.   Musculoskeletal:  Negative for back pain, neck pain and neck stiffness.   Skin:  Negative for color change.   Allergic/Immunologic: Negative for immunocompromised state.   Neurological:  Negative for dizziness, seizures, syncope, weakness, light-headedness, numbness and headaches.

## 2025-05-20 NOTE — ED TRIAGE NOTES
Pt presents to the ED w/ c/o abdominal pain that began on Sunday. States points to her epigastric region as where her pain begins and states it radiates to her RUQ and towards her back. Denies previous abdominal surgeries. Also reports nausea.

## 2025-05-20 NOTE — CONSULTS
Emotionally Abused: No     Physically Abused: No     Sexually Abused: No   Housing Stability: Low Risk  (5/20/2025)    Housing Stability Vital Sign     Unable to Pay for Housing in the Last Year: No     Number of Times Moved in the Last Year: 1     Homeless in the Last Year: No      Current Facility-Administered Medications   Medication Dose Route Frequency    sodium chloride flush 0.9 % injection 5-40 mL  5-40 mL IntraVENous 2 times per day    sodium chloride flush 0.9 % injection 5-40 mL  5-40 mL IntraVENous PRN    0.9 % sodium chloride infusion   IntraVENous PRN    potassium chloride (KLOR-CON) extended release tablet 40 mEq  40 mEq Oral PRN    Or    potassium bicarb-citric acid (EFFER-K) effervescent tablet 40 mEq  40 mEq Oral PRN    Or    potassium chloride 10 mEq/100 mL IVPB (Peripheral Line)  10 mEq IntraVENous PRN    magnesium sulfate 2000 mg in 50 mL IVPB premix  2,000 mg IntraVENous PRN    ondansetron (ZOFRAN-ODT) disintegrating tablet 4 mg  4 mg Oral Q8H PRN    Or    ondansetron (ZOFRAN) injection 4 mg  4 mg IntraVENous Q6H PRN    polyethylene glycol (GLYCOLAX) packet 17 g  17 g Oral Daily PRN    0.9 % sodium chloride infusion   IntraVENous Continuous    insulin lispro (HUMALOG,ADMELOG) injection vial 0-8 Units  0-8 Units SubCUTAneous 4x Daily AC & HS    pantoprazole (PROTONIX) tablet 40 mg  40 mg Oral QAM AC    HYDROmorphone (DILAUDID) injection 0.25 mg  0.25 mg IntraVENous Q3H PRN    Or    HYDROmorphone (DILAUDID) injection 0.5 mg  0.5 mg IntraVENous Q3H PRN      Allergies   Allergen Reactions    Pork-Derived Products Hives    Pork Allergy Rash       Review of Systems:     []     Unable to obtain  ROS due to  []    mental status change  []    sedated   []    intubated   [x]    Total of 12 system negative, unless specified below or in HPI:  Constitutional: negative fever, negative chills, negative weight loss  Eyes:   negative visual changes  ENT:   negative sore throat, tongue or lip  swelling  Respiratory:  negative cough, negative dyspnea  Cards:   negative for chest pain, palpitations, lower extremity edema  GI:   See HPI  :  negative for frequency, dysuria  Integument:  negative for rash and pruritus  Heme:  negative for easy bruising and gum/nose bleeding  Musculoskel: negative for myalgias, back pain and muscle weakness  Neuro:  negative for headaches, dizziness, vertigo  Psych:  negative for feelings of anxiety, depression     Objective:      Patient Vitals for the past 8 hrs:   BP Temp Temp src Pulse Resp SpO2   25 0738 115/73 98.6 °F (37 °C) Oral 75 18 98 %   25 0630 111/85 98.1 °F (36.7 °C) Oral 75 16 96 %   25 0600 112/72 98.7 °F (37.1 °C) Oral 77 16 97 %   25 0500 108/76 -- -- -- -- 98 %   25 0445 110/70 -- -- -- -- 94 %   25 0430 107/71 -- -- -- -- 94 %   25 0415 (!) 112/99 -- -- -- -- 94 %   25 0400 109/70 -- -- -- -- 95 %   25 0345 111/72 -- -- -- -- 93 %   25 0315 125/67 -- -- -- -- 98 %   25 0300 127/73 -- -- -- -- 98 %   25 0249 129/77 -- -- -- -- 98 %   25 0245 129/77 -- -- -- -- 98 %   25 0230 131/88 -- -- -- -- 99 %       Temp (24hrs), Av.7 °F (37.1 °C), Min:98.1 °F (36.7 °C), Max:99.5 °F (37.5 °C)      Physical Exam:  General:  Alert, cooperative, no distress, appears stated age.   Eyes:  Conjunctivae/corneas clear. PERRL, EOMs intact.   Mouth/Throat: Lips, mucosa, and tongue normal.    Neck: Supple, symmetrical, trachea midline, no adenopathy   Back:   Symmetric. ROM normal.   Lungs:   No distress.   Heart:  Regular rate.   Abdomen:   Soft, tender RUQ/epigastric, nondistended. No scars.    Extremities: Extremities normal, atraumatic, no cyanosis or edema.   Skin: Skin color, texture, turgor normal. No rashes or lesions.       Lab Results   Component Value Date    WBC 7.2 2025    HGB 13.8 2025    HCT 38.9 2025    MCV 81.9 2025     2025     Lab

## 2025-05-20 NOTE — ED NOTES
TRANSFER - OUT REPORT:    Verbal report given to MOUNIKA Kirkpatrick on Ashleigh Ospina  being transferred to [facility] for routine progression of patient care       Report consisted of patient's Situation, Background, Assessment and   Recommendations(SBAR).     Information from the following report(s) Nurse Handoff Report, Index, ED Encounter Summary, ED SBAR, Adult Overview, Intake/Output, MAR, Recent Results, and Neuro Assessment was reviewed with the receiving nurse.    [place] Assessment:                           Lines:   Peripheral IV 05/20/25 Right Antecubital (Active)   Site Assessment Clean, dry & intact 05/20/25 0159   Line Status Brisk blood return 05/20/25 0159   Line Care Connections checked and tightened 05/20/25 0159   Phlebitis Assessment No symptoms 05/20/25 0159   Alcohol Cap Used No 05/20/25 0159   Dressing Status Clean, dry & intact 05/20/25 0159   Dressing Type Transparent 05/20/25 0159        Opportunity for questions and clarification was provided.      Patient transported with:  [address] [facility]  Patient-specific medications from Pharmacy

## 2025-05-20 NOTE — H&P
[facility]  [address] [place], [place]  [address] [phone]    Hospital Medicine History and Physical      NAME:       Ashleigh Ospina   :       1990   MRN:      [id number]     Date of service:   2025     Chief  Complaint:  Abdominal pain x 2 days     History Of Presenting Illness:       Ms. Soraya Ospina is a 35 y.o. female who is being admitted for cholelithiasis with choledocholithiasis. Ms. Soraya Ospina presented to our [facility] today complaining of a now persistent RUQ abdominal pain, radiating to her back that started 2 days ago. She has associated nausea bit not vomiting. No fever or chills. No alcohol use and no urinary symptoms. In the ED, she was found to have elevated LFTs and a CT scan abdomen and pelvis done showed cholelithiasis and choledocholithiasis. Hepatic steatosis. She will be admitted for further management. Discussed with her through a [employer] video interpretor Maggie.    Allergies   Allergen Reactions    Pork-Derived Products Hives    Pork Allergy Rash       Prior to Admission medications    Medication Sig Start Date End Date Taking? Authorizing Provider   metFORMIN (GLUCOPHAGE) 1000 MG tablet 1 po bid after meals 25   Chalo Duncan MD   glipiZIDE (GLUCOTROL) 5 MG tablet Take 1 tab po bid 30 min ac dinner 25   Deborah Isabel, ARNOLD - NP       Past Medical History:   Diagnosis Date    Gestational diabetes mellitus     Type 2 diabetes mellitus (HCC) 2016        No past surgical history on file.    Social History     Tobacco Use    Smoking status: Never     Passive exposure: Never    Smokeless tobacco: Never   Substance Use Topics    Alcohol use: Not Currently        Family History   Problem Relation Age of Onset    Diabetes Father     Kidney Disease Sister 37        Dialysis     Review of  have reviewed reports and independently interpreted the following  diagnostic tests    Diagnostic testing:    Laboratory data reviewed and independently interpreted:    Recent Labs     05/20/25 0155   WBC 7.2   HGB 13.8   HCT 38.9   RBC 4.75   MCV 81.9   MCH 29.1        No results found for: \"LACTA\"  Recent Labs     05/20/25 0155   *   K 4.1   CL 98   CO2 21*   GLUCOSE 378*   BUN 4*   CREATININE <0.47*   CALCIUM 9.3   BILITOT 4.4*   ALKPHOS 200*   *   *     No components found for: \"GLUCOSEPOC\"  Lab Results   Component Value Date/Time    CHOL 222 09/03/2024 11:41 AM    TRIG 263 09/03/2024 11:41 AM    HDL 43 09/03/2024 11:41 AM     Imaging data reviewed:    CT ABDOMEN PELVIS W IV CONTRAST Additional Contrast? None  Result Date: 5/20/2025  Cholelithiasis and choledocholithiasis. Hepatic steatosis. Electronically signed by SCOTTIE MCMULLEN I have also reviewed available old medical records.     Assessment & Impression:     Ms. Soraya Ospina is a 35 y.o. female being evaluated for:     Principal Problem:    Cholelithiasis with choledocholithiasis  Active Problems:    Type 2 diabetes mellitus with hyperglycemia, without long-term current use of insulin (HCC)    Transaminitis  Resolved Problems:    * No resolved hospital problems. *       Plan of management:    Cholelithiasis with choledocholithiasis and Transaminitis POA: likely has a CBD obstruction. Admit to hospital. Keep NPO. Start IV Morphine PRN, IV fluids. Consult both general surgery and GI. Will defer to GI for need for an MRCP. Add an acute hepatitis panel and an acetaminophen levels. Monitor LFTs    Type 2 diabetes mellitus with hyperglycemia, without long-term current use of insulin POA: recent A1c in 3/2025 was 9.7. Now NPO. Hydrate with IV fluids, monitor blood glucose. Lispro sliding scale. Hold Glipizide and Metformin    Code Status:  Full    Surrogate decision maker: Family    Certification: Given the high risk of

## 2025-05-20 NOTE — DISCHARGE SUMMARY
Discharge Summary     PATIENT ID: Ashleigh Ospina  MRN: [id number]   YOB: 1990    DATE OF ADMISSION: 5/20/2025  2:06 AM    DATE OF DISCHARGE: 5/20/2025  PRIMARY CARE PROVIDER: Chalo Duncan MD     ATTENDING PHYSICIAN: Celio Oleary MD  DISCHARGING PROVIDER: Celio Oleary MD    To contact this individual call [phone] and ask the  to page.  If unavailable ask to be transferred the Adult Hospitalist Department.    CONSULTATIONS: IP CONSULT TO GI  IP CONSULT TO GENERAL SURGERY    PROCEDURES/SURGERIES: * No surgery found *    RECOMMENDATIONS FOR PCP/Follow up provider:  none    ADMISSION HPI:   Ms. Soraya Ospina is a 35 y.o. female who is being admitted for cholelithiasis with choledocholithiasis. Ms. Soraya Ospina presented to our [place] [facility] today complaining of a now persistent RUQ abdominal pain, radiating to her back that started 2 days ago. She has associated nausea bit not vomiting. No fever or chills. No alcohol use and no urinary symptoms. In the ED, she was found to have elevated LFTs and a CT scan abdomen and pelvis done showed cholelithiasis and choledocholithiasis. Hepatic steatosis.    DISCHARGE DIAGNOSES / PLAN:      Cholelithiasis with choledocholithiasis and Transaminitis POA: likely has a CBD obstruction. Keep NPO. IV dilaudid PRN, IV fluids. Consult both general surgery and GI. Zosyn started by GI. Per discussion w GI, pt will need ERCP but do not have anesthesiology staff here at [facility] so will need to transfer to [facility] for this today.     Type 2 diabetes mellitus with hyperglycemia, without long-term current use of insulin POA: recent A1c in 3/2025 was 9.7. Now NPO. Hydrate with IV fluids, monitor blood glucose. Lispro sliding scale. Hold Glipizide and Metformin        No results found for this or any previous visit.      CT ABDOMEN

## 2025-05-20 NOTE — PROGRESS NOTES
Clarified with Endoscopy, patient to have ERCP tomorrow (5.21.25) here at [facility].

## 2025-05-20 NOTE — ED NOTES
Pt discharged out of the ED via stretcher to [facility] with [address] transport crew.

## 2025-05-21 ENCOUNTER — ANESTHESIA EVENT (OUTPATIENT)
Facility: HOSPITAL | Age: 35
DRG: 446 | End: 2025-05-21
Payer: MEDICAID

## 2025-05-21 ENCOUNTER — ANESTHESIA (OUTPATIENT)
Facility: HOSPITAL | Age: 35
DRG: 446 | End: 2025-05-21
Payer: MEDICAID

## 2025-05-21 ENCOUNTER — APPOINTMENT (OUTPATIENT)
Facility: HOSPITAL | Age: 35
DRG: 419 | End: 2025-05-21
Payer: MEDICAID

## 2025-05-21 LAB
ALBUMIN SERPL-MCNC: 3.3 G/DL (ref 3.5–5)
ALBUMIN/GLOB SERPL: 1 (ref 1.1–2.2)
ALP SERPL-CCNC: 184 U/L (ref 45–117)
ALT SERPL-CCNC: 480 U/L (ref 12–78)
ANION GAP SERPL CALC-SCNC: 8 MMOL/L (ref 2–12)
AST SERPL-CCNC: 211 U/L (ref 15–37)
B-OH-BUTYR SERPL-SCNC: 1.28 MMOL/L
BASOPHILS # BLD: 0.04 K/UL (ref 0–0.1)
BASOPHILS NFR BLD: 0.5 % (ref 0–1)
BILIRUB SERPL-MCNC: 3.4 MG/DL (ref 0.2–1)
BUN SERPL-MCNC: 7 MG/DL (ref 6–20)
BUN/CREAT SERPL: 16 (ref 12–20)
CALCIUM SERPL-MCNC: 9 MG/DL (ref 8.5–10.1)
CHLORIDE SERPL-SCNC: 105 MMOL/L (ref 97–108)
CO2 SERPL-SCNC: 22 MMOL/L (ref 21–32)
CREAT SERPL-MCNC: 0.44 MG/DL (ref 0.55–1.02)
DIFFERENTIAL METHOD BLD: NORMAL
EOSINOPHIL # BLD: 0.15 K/UL (ref 0–0.4)
EOSINOPHIL NFR BLD: 2 % (ref 0–7)
ERYTHROCYTE [DISTWIDTH] IN BLOOD BY AUTOMATED COUNT: 12.5 % (ref 11.5–14.5)
EST. AVERAGE GLUCOSE BLD GHB EST-MCNC: 226 MG/DL
GLOBULIN SER CALC-MCNC: 3.3 G/DL (ref 2–4)
GLUCOSE BLD STRIP.AUTO-MCNC: 213 MG/DL (ref 65–117)
GLUCOSE BLD STRIP.AUTO-MCNC: 215 MG/DL (ref 65–117)
GLUCOSE BLD STRIP.AUTO-MCNC: 253 MG/DL (ref 65–117)
GLUCOSE BLD STRIP.AUTO-MCNC: 258 MG/DL (ref 65–117)
GLUCOSE BLD STRIP.AUTO-MCNC: 263 MG/DL (ref 65–117)
GLUCOSE SERPL-MCNC: 275 MG/DL (ref 65–100)
HBA1C MFR BLD: 9.5 % (ref 4–5.6)
HCT VFR BLD AUTO: 37.3 % (ref 35–47)
HGB BLD-MCNC: 12.6 G/DL (ref 11.5–16)
IMM GRANULOCYTES # BLD AUTO: 0.04 K/UL (ref 0–0.04)
IMM GRANULOCYTES NFR BLD AUTO: 0.5 % (ref 0–0.5)
LYMPHOCYTES # BLD: 1.8 K/UL (ref 0.8–3.5)
LYMPHOCYTES NFR BLD: 24.4 % (ref 12–49)
MAGNESIUM SERPL-MCNC: 2.1 MG/DL (ref 1.6–2.4)
MCH RBC QN AUTO: 28.8 PG (ref 26–34)
MCHC RBC AUTO-ENTMCNC: 33.8 G/DL (ref 30–36.5)
MCV RBC AUTO: 85.4 FL (ref 80–99)
MONOCYTES # BLD: 0.39 K/UL (ref 0–1)
MONOCYTES NFR BLD: 5.3 % (ref 5–13)
NEUTS SEG # BLD: 4.95 K/UL (ref 1.8–8)
NEUTS SEG NFR BLD: 67.3 % (ref 32–75)
NRBC # BLD: 0 K/UL (ref 0–0.01)
NRBC BLD-RTO: 0 PER 100 WBC
PHOSPHATE SERPL-MCNC: 3.2 MG/DL (ref 2.6–4.7)
PLATELET # BLD AUTO: 267 K/UL (ref 150–400)
PMV BLD AUTO: 9.6 FL (ref 8.9–12.9)
POTASSIUM SERPL-SCNC: 4.2 MMOL/L (ref 3.5–5.1)
PROT SERPL-MCNC: 6.6 G/DL (ref 6.4–8.2)
RBC # BLD AUTO: 4.37 M/UL (ref 3.8–5.2)
SERVICE CMNT-IMP: ABNORMAL
SODIUM SERPL-SCNC: 135 MMOL/L (ref 136–145)
WBC # BLD AUTO: 7.4 K/UL (ref 3.6–11)

## 2025-05-21 PROCEDURE — 2580000003 HC RX 258: Performed by: STUDENT IN AN ORGANIZED HEALTH CARE EDUCATION/TRAINING PROGRAM

## 2025-05-21 PROCEDURE — 7100000011 HC PHASE II RECOVERY - ADDTL 15 MIN: Performed by: SPECIALIST

## 2025-05-21 PROCEDURE — 80053 COMPREHEN METABOLIC PANEL: CPT

## 2025-05-21 PROCEDURE — 6370000000 HC RX 637 (ALT 250 FOR IP)

## 2025-05-21 PROCEDURE — 99221 1ST HOSP IP/OBS SF/LOW 40: CPT

## 2025-05-21 PROCEDURE — 0F778ZZ DILATION OF COMMON HEPATIC DUCT, VIA NATURAL OR ARTIFICIAL OPENING ENDOSCOPIC: ICD-10-PCS | Performed by: SPECIALIST

## 2025-05-21 PROCEDURE — 7100000010 HC PHASE II RECOVERY - FIRST 15 MIN: Performed by: SPECIALIST

## 2025-05-21 PROCEDURE — 6360000002 HC RX W HCPCS: Performed by: NURSE ANESTHETIST, CERTIFIED REGISTERED

## 2025-05-21 PROCEDURE — 83735 ASSAY OF MAGNESIUM: CPT

## 2025-05-21 PROCEDURE — 2500000003 HC RX 250 WO HCPCS: Performed by: INTERNAL MEDICINE

## 2025-05-21 PROCEDURE — 83036 HEMOGLOBIN GLYCOSYLATED A1C: CPT

## 2025-05-21 PROCEDURE — C1769 GUIDE WIRE: HCPCS | Performed by: SPECIALIST

## 2025-05-21 PROCEDURE — 3700000001 HC ADD 15 MINUTES (ANESTHESIA): Performed by: SPECIALIST

## 2025-05-21 PROCEDURE — 85025 COMPLETE CBC W/AUTO DIFF WBC: CPT

## 2025-05-21 PROCEDURE — 36415 COLL VENOUS BLD VENIPUNCTURE: CPT

## 2025-05-21 PROCEDURE — 6360000002 HC RX W HCPCS: Performed by: INTERNAL MEDICINE

## 2025-05-21 PROCEDURE — 82962 GLUCOSE BLOOD TEST: CPT

## 2025-05-21 PROCEDURE — 84100 ASSAY OF PHOSPHORUS: CPT

## 2025-05-21 PROCEDURE — 3700000000 HC ANESTHESIA ATTENDED CARE: Performed by: SPECIALIST

## 2025-05-21 PROCEDURE — 6370000000 HC RX 637 (ALT 250 FOR IP): Performed by: INTERNAL MEDICINE

## 2025-05-21 PROCEDURE — 6360000002 HC RX W HCPCS: Performed by: STUDENT IN AN ORGANIZED HEALTH CARE EDUCATION/TRAINING PROGRAM

## 2025-05-21 PROCEDURE — 2720000010 HC SURG SUPPLY STERILE: Performed by: SPECIALIST

## 2025-05-21 PROCEDURE — 3600007513: Performed by: SPECIALIST

## 2025-05-21 PROCEDURE — 2709999900 HC NON-CHARGEABLE SUPPLY: Performed by: SPECIALIST

## 2025-05-21 PROCEDURE — BF141ZZ FLUOROSCOPY OF GALLBLADDER, BILE DUCTS AND PANCREATIC DUCTS USING LOW OSMOLAR CONTRAST: ICD-10-PCS | Performed by: SPECIALIST

## 2025-05-21 PROCEDURE — C1726 CATH, BAL DIL, NON-VASCULAR: HCPCS | Performed by: SPECIALIST

## 2025-05-21 PROCEDURE — 3600007503: Performed by: SPECIALIST

## 2025-05-21 PROCEDURE — 2580000003 HC RX 258: Performed by: NURSE ANESTHETIST, CERTIFIED REGISTERED

## 2025-05-21 PROCEDURE — 82010 KETONE BODYS QUAN: CPT

## 2025-05-21 PROCEDURE — 1100000000 HC RM PRIVATE

## 2025-05-21 RX ORDER — PROCHLORPERAZINE EDISYLATE 5 MG/ML
10 INJECTION INTRAMUSCULAR; INTRAVENOUS EVERY 6 HOURS PRN
Status: DISCONTINUED | OUTPATIENT
Start: 2025-05-21 | End: 2025-05-22 | Stop reason: HOSPADM

## 2025-05-21 RX ORDER — ONDANSETRON 2 MG/ML
4 INJECTION INTRAMUSCULAR; INTRAVENOUS ONCE
Status: COMPLETED | OUTPATIENT
Start: 2025-05-21 | End: 2025-05-21

## 2025-05-21 RX ORDER — DEXAMETHASONE SODIUM PHOSPHATE 4 MG/ML
INJECTION, SOLUTION INTRA-ARTICULAR; INTRALESIONAL; INTRAMUSCULAR; INTRAVENOUS; SOFT TISSUE
Status: DISCONTINUED | OUTPATIENT
Start: 2025-05-21 | End: 2025-05-21 | Stop reason: SDUPTHER

## 2025-05-21 RX ORDER — ONDANSETRON 2 MG/ML
INJECTION INTRAMUSCULAR; INTRAVENOUS
Status: DISCONTINUED | OUTPATIENT
Start: 2025-05-21 | End: 2025-05-21 | Stop reason: SDUPTHER

## 2025-05-21 RX ORDER — SODIUM CHLORIDE, SODIUM LACTATE, POTASSIUM CHLORIDE, CALCIUM CHLORIDE 600; 310; 30; 20 MG/100ML; MG/100ML; MG/100ML; MG/100ML
INJECTION, SOLUTION INTRAVENOUS
Status: DISCONTINUED | OUTPATIENT
Start: 2025-05-21 | End: 2025-05-21 | Stop reason: SDUPTHER

## 2025-05-21 RX ORDER — FENTANYL CITRATE 50 UG/ML
INJECTION, SOLUTION INTRAMUSCULAR; INTRAVENOUS
Status: DISCONTINUED | OUTPATIENT
Start: 2025-05-21 | End: 2025-05-21 | Stop reason: SDUPTHER

## 2025-05-21 RX ORDER — SIMETHICONE 40MG/0.6ML
SUSPENSION, DROPS(FINAL DOSAGE FORM)(ML) ORAL
Status: DISPENSED
Start: 2025-05-21 | End: 2025-05-21

## 2025-05-21 RX ORDER — IOPAMIDOL 612 MG/ML
INJECTION, SOLUTION INTRAVASCULAR
Status: DISPENSED
Start: 2025-05-21 | End: 2025-05-21

## 2025-05-21 RX ORDER — INSULIN GLARGINE 100 [IU]/ML
0.25 INJECTION, SOLUTION SUBCUTANEOUS DAILY
Status: DISCONTINUED | OUTPATIENT
Start: 2025-05-21 | End: 2025-05-22

## 2025-05-21 RX ORDER — PROPOFOL 10 MG/ML
INJECTION, EMULSION INTRAVENOUS
Status: DISCONTINUED | OUTPATIENT
Start: 2025-05-21 | End: 2025-05-21 | Stop reason: SDUPTHER

## 2025-05-21 RX ORDER — SUCCINYLCHOLINE/SOD CL,ISO/PF 100 MG/5ML
SYRINGE (ML) INTRAVENOUS
Status: DISCONTINUED | OUTPATIENT
Start: 2025-05-21 | End: 2025-05-21 | Stop reason: SDUPTHER

## 2025-05-21 RX ORDER — DEXTROSE MONOHYDRATE 100 MG/ML
INJECTION, SOLUTION INTRAVENOUS CONTINUOUS PRN
Status: DISCONTINUED | OUTPATIENT
Start: 2025-05-21 | End: 2025-05-22 | Stop reason: HOSPADM

## 2025-05-21 RX ADMIN — DEXAMETHASONE SODIUM PHOSPHATE 4 MG: 4 INJECTION INTRA-ARTICULAR; INTRALESIONAL; INTRAMUSCULAR; INTRAVENOUS; SOFT TISSUE at 12:18

## 2025-05-21 RX ADMIN — HYDROMORPHONE HYDROCHLORIDE 0.5 MG: 1 INJECTION, SOLUTION INTRAMUSCULAR; INTRAVENOUS; SUBCUTANEOUS at 02:40

## 2025-05-21 RX ADMIN — SODIUM CHLORIDE, POTASSIUM CHLORIDE, SODIUM LACTATE AND CALCIUM CHLORIDE: 600; 310; 30; 20 INJECTION, SOLUTION INTRAVENOUS at 12:03

## 2025-05-21 RX ADMIN — PANTOPRAZOLE SODIUM 40 MG: 40 TABLET, DELAYED RELEASE ORAL at 14:33

## 2025-05-21 RX ADMIN — Medication 100 MG: at 12:07

## 2025-05-21 RX ADMIN — PIPERACILLIN AND TAZOBACTAM 3375 MG: 3; .375 INJECTION, POWDER, LYOPHILIZED, FOR SOLUTION INTRAVENOUS at 01:27

## 2025-05-21 RX ADMIN — ONDANSETRON 4 MG: 2 INJECTION, SOLUTION INTRAMUSCULAR; INTRAVENOUS at 07:14

## 2025-05-21 RX ADMIN — PIPERACILLIN AND TAZOBACTAM 3375 MG: 3; .375 INJECTION, POWDER, LYOPHILIZED, FOR SOLUTION INTRAVENOUS at 17:28

## 2025-05-21 RX ADMIN — PIPERACILLIN AND TAZOBACTAM 3375 MG: 3; .375 INJECTION, POWDER, LYOPHILIZED, FOR SOLUTION INTRAVENOUS at 09:10

## 2025-05-21 RX ADMIN — INSULIN LISPRO 4 UNITS: 100 INJECTION, SOLUTION INTRAVENOUS; SUBCUTANEOUS at 17:20

## 2025-05-21 RX ADMIN — SODIUM CHLORIDE, PRESERVATIVE FREE 10 ML: 5 INJECTION INTRAVENOUS at 09:18

## 2025-05-21 RX ADMIN — INSULIN LISPRO 2 UNITS: 100 INJECTION, SOLUTION INTRAVENOUS; SUBCUTANEOUS at 14:32

## 2025-05-21 RX ADMIN — INSULIN LISPRO 4 UNITS: 100 INJECTION, SOLUTION INTRAVENOUS; SUBCUTANEOUS at 09:07

## 2025-05-21 RX ADMIN — SODIUM CHLORIDE, PRESERVATIVE FREE 10 ML: 5 INJECTION INTRAVENOUS at 21:47

## 2025-05-21 RX ADMIN — INSULIN LISPRO 4 UNITS: 100 INJECTION, SOLUTION INTRAVENOUS; SUBCUTANEOUS at 21:46

## 2025-05-21 RX ADMIN — ONDANSETRON 4 MG: 2 INJECTION, SOLUTION INTRAMUSCULAR; INTRAVENOUS at 02:39

## 2025-05-21 RX ADMIN — HYDROMORPHONE HYDROCHLORIDE 0.25 MG: 1 INJECTION, SOLUTION INTRAMUSCULAR; INTRAVENOUS; SUBCUTANEOUS at 09:17

## 2025-05-21 RX ADMIN — FENTANYL CITRATE 100 MCG: 50 INJECTION, SOLUTION INTRAMUSCULAR; INTRAVENOUS at 12:07

## 2025-05-21 RX ADMIN — INSULIN GLARGINE 18 UNITS: 100 INJECTION, SOLUTION SUBCUTANEOUS at 09:29

## 2025-05-21 RX ADMIN — PROPOFOL 200 MG: 10 INJECTION, EMULSION INTRAVENOUS at 12:07

## 2025-05-21 RX ADMIN — ONDANSETRON 4 MG: 2 INJECTION, SOLUTION INTRAMUSCULAR; INTRAVENOUS at 12:19

## 2025-05-21 ASSESSMENT — PAIN DESCRIPTION - DESCRIPTORS
DESCRIPTORS: ACHING
DESCRIPTORS: STABBING

## 2025-05-21 ASSESSMENT — PAIN DESCRIPTION - ORIENTATION: ORIENTATION: UPPER

## 2025-05-21 ASSESSMENT — PAIN SCALES - GENERAL
PAINLEVEL_OUTOF10: 2
PAINLEVEL_OUTOF10: 0
PAINLEVEL_OUTOF10: 4

## 2025-05-21 ASSESSMENT — PAIN DESCRIPTION - LOCATION
LOCATION: ABDOMEN
LOCATION: ABDOMEN

## 2025-05-21 ASSESSMENT — PAIN - FUNCTIONAL ASSESSMENT: PAIN_FUNCTIONAL_ASSESSMENT: 0-10

## 2025-05-21 NOTE — PROGRESS NOTES
TRANSFER - OUT REPORT:    Verbal report given to [place] on Ashleigh Ospina  being transferred to room 513 for routine post-op       Report consisted of patient's Situation, Background, Assessment and   Recommendations(SBAR).     Information from the following report(s) Nurse Handoff Report was reviewed with the receiving nurse.           Lines:   Peripheral IV 05/20/25 Right Antecubital (Active)   Site Assessment Clean, dry & intact 05/21/25 1034   Line Status Infusing 05/21/25 1034   Line Care Connections checked and tightened 05/21/25 1034   Phlebitis Assessment No symptoms 05/21/25 1034   Infiltration Assessment 0 05/21/25 1034   Alcohol Cap Used Yes 05/21/25 1034   Dressing Status Clean, dry & intact 05/21/25 1034   Dressing Type Transparent 05/21/25 1034        Opportunity for questions and clarification was provided.      Patient transported with:  Tech

## 2025-05-21 NOTE — PROGRESS NOTES
Received recovery report from anesthesia team, see anesthesia note. Abdomen remains soft and non-tender post-procedure. Pt has no complaints at this time and tolerated procedure well. Endoscope was pre-cleaned at the bedside by Yuan gaming immediately following procedure. Post recovery report given to Magui Brooks  .

## 2025-05-21 NOTE — CONSULTS
Session ID: [id number]  Language: Welsh   ID: #[id number]   Name: Jonathon    Consent discussion, reviewed indications, risks, alternatives; she asked we proceed.  Written consent confirmed.  Ramon Forte MD

## 2025-05-21 NOTE — ANESTHESIA PRE PROCEDURE
Department of Anesthesiology  Preprocedure Note       Name:  Ashleigh Ospina   Age:  35 y.o.  :  1990                                          MRN:  [id number]         Date:  2025      Surgeon: Surgeon(s):  Ramon Forte MD    Procedure: Procedure(s):  ENDOSCOPIC RETROGRADE CHOLANGIOPANCREATOGRAPHY    Medications prior to admission:   Prior to Admission medications    Medication Sig Start Date End Date Taking? Authorizing Provider   metFORMIN (GLUCOPHAGE) 1000 MG tablet 1 po bid after meals 25   Chalo Duncan MD   glipiZIDE (GLUCOTROL) 5 MG tablet Take 1 tab po bid 30 min ac dinner 25   Deborah Isabel APRN - NP       Current medications:    Current Facility-Administered Medications   Medication Dose Route Frequency Provider Last Rate Last Admin    prochlorperazine (COMPAZINE) injection 10 mg  10 mg IntraVENous Q6H PRN Celio Oleary MD        glucose chewable tablet 16 g  4 tablet Oral PRN Bharath Desouza APRN - CNS        dextrose bolus 10% 125 mL  125 mL IntraVENous PRN Bharath Desouza APRN - CNS        Or    dextrose bolus 10% 250 mL  250 mL IntraVENous PRN Bharath Desouza APRN - CNS        glucagon injection 1 mg  1 mg SubCUTAneous PRN Bharath Desouza APRN - CNS        dextrose 10 % infusion   IntraVENous Continuous PRN Bharath Desouza APRN - CNS        insulin glargine (LANTUS) injection vial 18 Units  0.25 Units/kg SubCUTAneous Daily Bharath Desouza APRN - CNS   18 Units at 25 0929    sodium chloride flush 0.9 % injection 5-40 mL  5-40 mL IntraVENous 2 times per day Delano Dumont MD   10 mL at 25 0918    sodium chloride flush 0.9 % injection 5-40 mL  5-40 mL IntraVENous PRN Delano Dumont MD        0.9 % sodium chloride infusion   IntraVENous PRN Delano Dumont MD        potassium chloride (KLOR-CON) extended release tablet 40 mEq  40 mEq Oral PRN Delano Dumont MD        Or    potassium bicarb-citric acid (EFFER-K)

## 2025-05-21 NOTE — PROGRESS NOTES
Please keep n.p.o. after midnight, plan for lap jason tomorrow afternoon pending availability.    Jessica Starr PA-C

## 2025-05-21 NOTE — ANESTHESIA POSTPROCEDURE EVALUATION
Department of Anesthesiology  Postprocedure Note    Patient: Ashleigh Ospina  MRN: [id number]  YOB: 1990  Date of evaluation: 5/21/2025    Procedure Summary       Date: 05/21/25 Room / Location: [facility] 03 / [facility] ENDOSCOPY    Anesthesia Start: 1203 Anesthesia Stop: 1244    Procedures:       ENDOSCOPIC RETROGRADE CHOLANGIOPANCREATOGRAPHY (Upper GI Region)      ENDOSCOPIC RETROGRADE CHOLANGIOPANCREATOGRAPHY SPHINCTER/PAPILLOTOMY (Upper GI Region)      ENDOSCOPIC RETROGRADE CHOLANGIOPANCREATOGRAPHY DILATION BALLOON (Upper GI Region) Diagnosis:       Cholangitis (HCC)      (Cholangitis (HCC) [K83.09])    Surgeons: Ramon Forte MD Responsible Provider: Ramon Salazar MD    Anesthesia Type: general ASA Status: 2            Anesthesia Type: No value filed.    Mony Phase I: Mony Score: 10    Mony Phase II:      Anesthesia Post Evaluation    Patient location during evaluation: PACU  Patient participation: complete - patient participated  Level of consciousness: awake  Pain score: 0  Airway patency: patent  Nausea & Vomiting: no nausea and no vomiting  Cardiovascular status: blood pressure returned to baseline  Respiratory status: acceptable  Hydration status: euvolemic  Pain management: adequate    No notable events documented.

## 2025-05-21 NOTE — PROGRESS NOTES
Assessment / Plan:   HD1 choledocholithiasis    ERCP planned for today  Lap jason timing dependent on OR availability, likely tomorrow afternoon  Continue present care    Jessica Starr PA-C  [facility]  Office:  [phone]  Fax:  [phone]        General Surgery Daily Progress Note      Patient: Ashleigh Ospina MRN: [id number]  SSN: [id number]    YOB: 1990  Age: 35 y.o.  Sex: female      Admit Date: 5/20/2025 for cholelithiasis with choledocholithiasis    Subjective:   Patient seen and examined.  Ongoing abdominal pain.  Better with pain medicine.    Current Facility-Administered Medications   Medication Dose Route Frequency    prochlorperazine (COMPAZINE) injection 10 mg  10 mg IntraVENous Q6H PRN    glucose chewable tablet 16 g  4 tablet Oral PRN    dextrose bolus 10% 125 mL  125 mL IntraVENous PRN    Or    dextrose bolus 10% 250 mL  250 mL IntraVENous PRN    glucagon injection 1 mg  1 mg SubCUTAneous PRN    dextrose 10 % infusion   IntraVENous Continuous PRN    insulin glargine (LANTUS) injection vial 18 Units  0.25 Units/kg SubCUTAneous Daily    sodium chloride flush 0.9 % injection 5-40 mL  5-40 mL IntraVENous 2 times per day    sodium chloride flush 0.9 % injection 5-40 mL  5-40 mL IntraVENous PRN    0.9 % sodium chloride infusion   IntraVENous PRN    potassium chloride (KLOR-CON) extended release tablet 40 mEq  40 mEq Oral PRN    Or    potassium bicarb-citric acid (EFFER-K) effervescent tablet 40 mEq  40 mEq Oral PRN    Or    potassium chloride 10 mEq/100 mL IVPB (Peripheral Line)  10 mEq IntraVENous PRN    magnesium sulfate 2000 mg in 50 mL IVPB premix  2,000 mg IntraVENous PRN    ondansetron (ZOFRAN-ODT) disintegrating tablet 4 mg  4 mg Oral Q8H PRN    Or    ondansetron (ZOFRAN) injection 4 mg  4 mg IntraVENous Q6H PRN    polyethylene glycol (GLYCOLAX) packet 17 g  17 g Oral Daily PRN    insulin lispro (HUMALOG,ADMELOG) injection vial 0-8 Units  0-8 Units

## 2025-05-21 NOTE — PERIOP NOTE
TRANSFER - IN REPORT:    Verbal report received from [facility] 5th Floor RN on Ashleigh Opsina  being received from 5th Floor - Room 513 for routine progression of patient care      Report consisted of patient's Situation, Background, Assessment and   Recommendations(SBAR).     Information from the following report(s) Nurse Handoff Report, Index, Adult Overview, Intake/Output, MAR, Recent Results, Med Rec Status, Procedure Verification, and Neuro Assessment was reviewed with the receiving nurse.    Opportunity for questions and clarification was provided.      Assessment completed upon patient's arrival to unit and care assumed.

## 2025-05-21 NOTE — OP NOTE
the patient or their decision maker provided both verbal and written consent to proceed.        Procedure in Detail:  After obtaining informed consent, positioning of the patient prone, and conduction of a pre-procedure pause or \"time out\" the endoscope was introduced into the mouth and advanced to the duodenum to visualize and instrument the ampullary opening.      Ampullary mucosa normal.  Wire guided cannulation, shallow wire intubation of PD on attempt #1, CBD cannulation with wire on attempt #2.    Cholangiography reveals ratty debris in the distal duct, no obvious stone.  Bile duct is of appropriate size, cystic duct fills and contrast spills to gallbladder, right/left ducts and intrahepatics normal.    Endoscopic sphincterotomy performed, hemostasis immediate.  There is then flow of contrast, bile, and whitish-yellow sludge from duct.      Using 12mm extraction balloon we swept from common hepatic to the duodenum with production of generous whitish yellow sludge and a whitish yellow stone.  Subsequent sweeps without additional stones, and scant sludge removed.  Non-occlusive repeat cholangiography performed, no residual debris or stones appreciated and contrast flows readily from the duct to the duodenum.  I repeated sweep x2 without additional debris and the procedure is judged complete.        Specimens: none           Impression: Choledocholithiasis and biliary sludge removed with endoscopic sphincterotomy and balloon sweep.  The whitish-yellow characteristics of sludge might suggest early cholangitis so occlusive imaging or injection of contrast avoided.    Recommendations:   Would plan on antibiotic therapy tailored to biliary organisms (zosyn appropriate) for another 24-48 hours with monitoring of vitals/WBC.    Would suggest cholecystectomy when the surgeon feels appropriate.    Would allow clear liquids now, if no new pain then regular diet at 1700 appropriate.        Thank you for entrusting me with  this patient's care.  Please do not hesitate to contact me with any questions or if I can be of assistance with any of your other patients' GI needs.  Signed By: Ramon Forte MD                        May 21, 2025     Surgical assistant none.  Implants none unless specified.

## 2025-05-21 NOTE — ANESTHESIA POSTPROCEDURE EVALUATION
Department of Anesthesiology  Postprocedure Note    Patient: Ashleigh Ospina  MRN: [id number]  YOB: 1990  Date of evaluation: 5/21/2025    Procedure Summary       Date: 05/21/25 Room / Location: [facility] 03 / [facility] ENDOSCOPY    Anesthesia Start: 1203 Anesthesia Stop: 1244    Procedures:       ENDOSCOPIC RETROGRADE CHOLANGIOPANCREATOGRAPHY (Upper GI Region)      ENDOSCOPIC RETROGRADE CHOLANGIOPANCREATOGRAPHY SPHINCTER/PAPILLOTOMY (Upper GI Region)      ENDOSCOPIC RETROGRADE CHOLANGIOPANCREATOGRAPHY DILATION BALLOON (Upper GI Region) Diagnosis:       Cholangitis (HCC)      (Cholangitis (HCC) [K83.09])    Surgeons: Ramon Forte MD Responsible Provider: Ramon Salazar MD    Anesthesia Type: general ASA Status: 2            Anesthesia Type: No value filed.    Mony Phase I: Mony Score: 10    Mony Phase II: Mony Score: 10    Anesthesia Post Evaluation    Patient location during evaluation: PACU  Patient participation: complete - patient participated  Level of consciousness: awake  Pain score: 0  Airway patency: patent  Nausea & Vomiting: no nausea and no vomiting  Cardiovascular status: blood pressure returned to baseline  Respiratory status: acceptable  Hydration status: euvolemic  Pain management: adequate    No notable events documented.

## 2025-05-21 NOTE — CONSULTS
[facility]  DIABETES MANAGEMENT CONSULT    Consulted by Provider for advanced nursing evaluation and care for inpatient blood glucose management.    Evaluation and Action Plan   Ashleigh Ospina is a 35y.o. F with a PMHX of Type 2 diabetes who presented to the ED on  with complaints of epigastric, right upper quadrant abdominal pain, and nausea. Gastroenterology following for findings concerning for choledocholithiasis. Patient scheduled for ERCP today. Diabetes Management asked to consult to assist with glycemic management.    Patient with a history of GDM. Patient reports Type 2 diabetes for about 10 years on Metformin 850 BID and Glipizide 5 mg daily. Patient reports previous HbA1C of 10%. Patient monitors Bg at home and reports numbers in the 200s and 300s. Current HbA1C 9.7%. Patient sees Dr. Isabel on an outpatient basis.    Patient with significant ketones in urine as well as an AG of 14 and low Co2 yesterday. With improvement in labs today and start of basal insulin will hopefully resolve any potential for DKA. Labs today indicate AG 8 and Co2 22.  Will order Beta hydroxy. Admission Glu 378. BG range 253-378 past 24 hours. ; Lantus 18 units initiated. Patient is NPO for ERCP today, will assess need for prandial insulin after diet resumes. Full assessment deferred as patient appears to be in a significant amount of pain.    Blood glucose pattern    Significant diabetes-related events over the past 24-72 hours  A1C 9.7%   Fasting B    Management Rationale Action Plan   Medication   Basal needs Using 0.25 units/kg/D based on weight Lantus 18 units   Nutritional needs Covers carb load in meals Will reassess once diet resumes   Corrective insulin Using medium sensitivity based on weight    Lab [x]        Hemoglobin A1c  Will send Beta hydroxy    Additional orders  NPO for ERCP        Diabetes Discharge Plan   Medication  TBD patient will most likely need

## 2025-05-21 NOTE — PROGRESS NOTES
[facility]                                                                                          Hospitalist Progress Note  Celio Oleary MD  Office Phone: [phone]        Date of Service:  2025  NAME:  Ashleigh Ospina  :  1990  MRN:  [id number]       Admission HPI:   Ms. Soraya Ospina is a 35 y.o. female who is being admitted for cholelithiasis with choledocholithiasis. Ms. Soraya Ospina presented to our [place] [facility] today complaining of a now persistent RUQ abdominal pain, radiating to her back that started 2 days ago. She has associated nausea bit not vomiting. No fever or chills. No alcohol use and no urinary symptoms. In the ED, she was found to have elevated LFTs and a CT scan abdomen and pelvis done showed cholelithiasis and choledocholithiasis. Hepatic steatosis.         Interval history / Subjective:     No complaints     Assessment & Plan:     Cholelithiasis with choledocholithiasis and Transaminitis POA: likely has a CBD obstruction.  IV dilaudid PRN, IV fluids. Consult both general surgery and GI. Zosyn started by GI.  ERCP  showed choledocholithiasis with biliary sludge, removed with endoscopic sphincterotomy and balloon sweep.  Possible signs of early cholangitis: Continue Zosyn until ~ per GI.  Cholecystectomy with surgery      Type 2 diabetes mellitus with hyperglycemia, without long-term current use of insulin POA: recent A1c in 3/2025 was 9.7. Now NPO. Hydrate with IV fluids, monitor blood glucose. Lispro sliding scale. Hold Glipizide and Metformin       Code status: Full  DVT Prophylaxis: SCDs  Anticipated Disposition: Home -       Review of Systems:   Pertinent symptoms noted in the HPI    Vital Signs:    Last 24hrs VS reviewed since prior progress note. Most recent are:  Vitals:    25 1607   BP: 120/78   Pulse: 81   Resp: 16   Temp: 98.8 °F (37.1 °C)   SpO2: 93%       Physical  face-to-face encounter with the patient, performing the medically necessary appropriate exam and history, counseled the patient/family/caregiver, ordered medications, tests and procedures as indicated for diagnostic and therapeutic purposes, consulted and communicated with other healthcare professionals including care coordination and discharge planning, and documented clinical information in the electronic health record.  ______________________________________________________________________                 Celio Oleary MD

## 2025-05-21 NOTE — PROGRESS NOTES
1305:    Patient In Endoscopy:  Patient given her dentures and put in.  Jewelry and pants in bag on her bed.

## 2025-05-22 ENCOUNTER — ANESTHESIA (OUTPATIENT)
Facility: HOSPITAL | Age: 35
DRG: 446 | End: 2025-05-22
Payer: MEDICAID

## 2025-05-22 ENCOUNTER — ANESTHESIA EVENT (OUTPATIENT)
Facility: HOSPITAL | Age: 35
DRG: 446 | End: 2025-05-22
Payer: MEDICAID

## 2025-05-22 VITALS
WEIGHT: 160.5 LBS | HEART RATE: 79 BPM | RESPIRATION RATE: 17 BRPM | DIASTOLIC BLOOD PRESSURE: 63 MMHG | OXYGEN SATURATION: 95 % | SYSTOLIC BLOOD PRESSURE: 115 MMHG | BODY MASS INDEX: 31.51 KG/M2 | TEMPERATURE: 98.4 F | HEIGHT: 60 IN

## 2025-05-22 LAB
ALBUMIN SERPL-MCNC: 3.4 G/DL (ref 3.5–5)
ALBUMIN/GLOB SERPL: 0.9 (ref 1.1–2.2)
ALP SERPL-CCNC: 178 U/L (ref 45–117)
ALT SERPL-CCNC: 344 U/L (ref 12–78)
ANION GAP SERPL CALC-SCNC: 6 MMOL/L (ref 2–12)
AST SERPL-CCNC: 99 U/L (ref 15–37)
BASOPHILS # BLD: 0.03 K/UL (ref 0–0.1)
BASOPHILS NFR BLD: 0.3 % (ref 0–1)
BILIRUB SERPL-MCNC: 2 MG/DL (ref 0.2–1)
BUN SERPL-MCNC: 6 MG/DL (ref 6–20)
BUN/CREAT SERPL: 12 (ref 12–20)
CALCIUM SERPL-MCNC: 9.6 MG/DL (ref 8.5–10.1)
CHLORIDE SERPL-SCNC: 103 MMOL/L (ref 97–108)
CO2 SERPL-SCNC: 27 MMOL/L (ref 21–32)
CREAT SERPL-MCNC: 0.51 MG/DL (ref 0.55–1.02)
DIFFERENTIAL METHOD BLD: ABNORMAL
EOSINOPHIL # BLD: 0.07 K/UL (ref 0–0.4)
EOSINOPHIL NFR BLD: 0.6 % (ref 0–7)
ERYTHROCYTE [DISTWIDTH] IN BLOOD BY AUTOMATED COUNT: 12.4 % (ref 11.5–14.5)
GLOBULIN SER CALC-MCNC: 3.6 G/DL (ref 2–4)
GLUCOSE BLD STRIP.AUTO-MCNC: 215 MG/DL (ref 65–117)
GLUCOSE BLD STRIP.AUTO-MCNC: 234 MG/DL (ref 65–117)
GLUCOSE BLD STRIP.AUTO-MCNC: 238 MG/DL (ref 65–117)
GLUCOSE SERPL-MCNC: 200 MG/DL (ref 65–100)
HCT VFR BLD AUTO: 38.2 % (ref 35–47)
HGB BLD-MCNC: 13 G/DL (ref 11.5–16)
IMM GRANULOCYTES # BLD AUTO: 0.07 K/UL (ref 0–0.04)
IMM GRANULOCYTES NFR BLD AUTO: 0.6 % (ref 0–0.5)
LYMPHOCYTES # BLD: 3.54 K/UL (ref 0.8–3.5)
LYMPHOCYTES NFR BLD: 32.6 % (ref 12–49)
MAGNESIUM SERPL-MCNC: 2.1 MG/DL (ref 1.6–2.4)
MCH RBC QN AUTO: 29.2 PG (ref 26–34)
MCHC RBC AUTO-ENTMCNC: 34 G/DL (ref 30–36.5)
MCV RBC AUTO: 85.8 FL (ref 80–99)
MONOCYTES # BLD: 0.56 K/UL (ref 0–1)
MONOCYTES NFR BLD: 5.2 % (ref 5–13)
NEUTS SEG # BLD: 6.59 K/UL (ref 1.8–8)
NEUTS SEG NFR BLD: 60.7 % (ref 32–75)
NRBC # BLD: 0 K/UL (ref 0–0.01)
NRBC BLD-RTO: 0 PER 100 WBC
PHOSPHATE SERPL-MCNC: 3.8 MG/DL (ref 2.6–4.7)
PLATELET # BLD AUTO: 276 K/UL (ref 150–400)
PMV BLD AUTO: 9.6 FL (ref 8.9–12.9)
POTASSIUM SERPL-SCNC: 4.7 MMOL/L (ref 3.5–5.1)
PROT SERPL-MCNC: 7 G/DL (ref 6.4–8.2)
RBC # BLD AUTO: 4.45 M/UL (ref 3.8–5.2)
SERVICE CMNT-IMP: ABNORMAL
SODIUM SERPL-SCNC: 136 MMOL/L (ref 136–145)
WBC # BLD AUTO: 10.9 K/UL (ref 3.6–11)

## 2025-05-22 PROCEDURE — 3600000014 HC SURGERY LEVEL 4 ADDTL 15MIN: Performed by: SURGERY

## 2025-05-22 PROCEDURE — 84100 ASSAY OF PHOSPHORUS: CPT

## 2025-05-22 PROCEDURE — 6370000000 HC RX 637 (ALT 250 FOR IP): Performed by: INTERNAL MEDICINE

## 2025-05-22 PROCEDURE — 0FT44ZZ RESECTION OF GALLBLADDER, PERCUTANEOUS ENDOSCOPIC APPROACH: ICD-10-PCS | Performed by: SURGERY

## 2025-05-22 PROCEDURE — 7100000001 HC PACU RECOVERY - ADDTL 15 MIN: Performed by: SURGERY

## 2025-05-22 PROCEDURE — 2500000003 HC RX 250 WO HCPCS: Performed by: SURGERY

## 2025-05-22 PROCEDURE — 6360000002 HC RX W HCPCS: Performed by: STUDENT IN AN ORGANIZED HEALTH CARE EDUCATION/TRAINING PROGRAM

## 2025-05-22 PROCEDURE — 88304 TISSUE EXAM BY PATHOLOGIST: CPT

## 2025-05-22 PROCEDURE — 80053 COMPREHEN METABOLIC PANEL: CPT

## 2025-05-22 PROCEDURE — 3700000000 HC ANESTHESIA ATTENDED CARE: Performed by: SURGERY

## 2025-05-22 PROCEDURE — 7100000011 HC PHASE II RECOVERY - ADDTL 15 MIN: Performed by: SURGERY

## 2025-05-22 PROCEDURE — 85025 COMPLETE CBC W/AUTO DIFF WBC: CPT

## 2025-05-22 PROCEDURE — 2580000003 HC RX 258: Performed by: ANESTHESIOLOGY

## 2025-05-22 PROCEDURE — 3600000004 HC SURGERY LEVEL 4 BASE: Performed by: SURGERY

## 2025-05-22 PROCEDURE — 6370000000 HC RX 637 (ALT 250 FOR IP)

## 2025-05-22 PROCEDURE — 6360000002 HC RX W HCPCS: Performed by: NURSE ANESTHETIST, CERTIFIED REGISTERED

## 2025-05-22 PROCEDURE — 6360000002 HC RX W HCPCS: Performed by: SURGERY

## 2025-05-22 PROCEDURE — 7100000000 HC PACU RECOVERY - FIRST 15 MIN: Performed by: SURGERY

## 2025-05-22 PROCEDURE — 2500000003 HC RX 250 WO HCPCS: Performed by: NURSE ANESTHETIST, CERTIFIED REGISTERED

## 2025-05-22 PROCEDURE — 83735 ASSAY OF MAGNESIUM: CPT

## 2025-05-22 PROCEDURE — 2709999900 HC NON-CHARGEABLE SUPPLY: Performed by: SURGERY

## 2025-05-22 PROCEDURE — 3700000001 HC ADD 15 MINUTES (ANESTHESIA): Performed by: SURGERY

## 2025-05-22 PROCEDURE — 2720000010 HC SURG SUPPLY STERILE: Performed by: SURGERY

## 2025-05-22 PROCEDURE — 2580000003 HC RX 258: Performed by: STUDENT IN AN ORGANIZED HEALTH CARE EDUCATION/TRAINING PROGRAM

## 2025-05-22 PROCEDURE — 82962 GLUCOSE BLOOD TEST: CPT

## 2025-05-22 PROCEDURE — 7100000010 HC PHASE II RECOVERY - FIRST 15 MIN: Performed by: SURGERY

## 2025-05-22 PROCEDURE — 2500000003 HC RX 250 WO HCPCS: Performed by: INTERNAL MEDICINE

## 2025-05-22 RX ORDER — FENTANYL CITRATE 50 UG/ML
100 INJECTION, SOLUTION INTRAMUSCULAR; INTRAVENOUS
Refills: 0 | Status: CANCELLED | OUTPATIENT
Start: 2025-05-22

## 2025-05-22 RX ORDER — LIDOCAINE HYDROCHLORIDE 20 MG/ML
INJECTION, SOLUTION EPIDURAL; INFILTRATION; INTRACAUDAL; PERINEURAL
Status: DISCONTINUED | OUTPATIENT
Start: 2025-05-22 | End: 2025-05-22 | Stop reason: SDUPTHER

## 2025-05-22 RX ORDER — NALOXONE HYDROCHLORIDE 0.4 MG/ML
INJECTION, SOLUTION INTRAMUSCULAR; INTRAVENOUS; SUBCUTANEOUS PRN
Status: DISCONTINUED | OUTPATIENT
Start: 2025-05-22 | End: 2025-05-22 | Stop reason: HOSPADM

## 2025-05-22 RX ORDER — ONDANSETRON 2 MG/ML
4 INJECTION INTRAMUSCULAR; INTRAVENOUS
Status: DISCONTINUED | OUTPATIENT
Start: 2025-05-22 | End: 2025-05-22 | Stop reason: HOSPADM

## 2025-05-22 RX ORDER — SODIUM CHLORIDE, SODIUM LACTATE, POTASSIUM CHLORIDE, CALCIUM CHLORIDE 600; 310; 30; 20 MG/100ML; MG/100ML; MG/100ML; MG/100ML
INJECTION, SOLUTION INTRAVENOUS CONTINUOUS
Status: CANCELLED | OUTPATIENT
Start: 2025-05-22

## 2025-05-22 RX ORDER — ONDANSETRON 2 MG/ML
INJECTION INTRAMUSCULAR; INTRAVENOUS
Status: DISCONTINUED | OUTPATIENT
Start: 2025-05-22 | End: 2025-05-22 | Stop reason: SDUPTHER

## 2025-05-22 RX ORDER — DIPHENHYDRAMINE HYDROCHLORIDE 50 MG/ML
12.5 INJECTION, SOLUTION INTRAMUSCULAR; INTRAVENOUS
Status: DISCONTINUED | OUTPATIENT
Start: 2025-05-22 | End: 2025-05-22 | Stop reason: HOSPADM

## 2025-05-22 RX ORDER — INSULIN GLARGINE 100 [IU]/ML
24 INJECTION, SOLUTION SUBCUTANEOUS DAILY
Status: DISCONTINUED | OUTPATIENT
Start: 2025-05-22 | End: 2025-05-22 | Stop reason: HOSPADM

## 2025-05-22 RX ORDER — LIDOCAINE HYDROCHLORIDE 10 MG/ML
1 INJECTION, SOLUTION EPIDURAL; INFILTRATION; INTRACAUDAL; PERINEURAL
Status: CANCELLED | OUTPATIENT
Start: 2025-05-22

## 2025-05-22 RX ORDER — ONDANSETRON 4 MG/1
4 TABLET, FILM COATED ORAL 3 TIMES DAILY PRN
Qty: 15 TABLET | Refills: 0 | Status: SHIPPED | OUTPATIENT
Start: 2025-05-22 | End: 2025-05-29 | Stop reason: ALTCHOICE

## 2025-05-22 RX ORDER — SODIUM CHLORIDE, SODIUM LACTATE, POTASSIUM CHLORIDE, CALCIUM CHLORIDE 600; 310; 30; 20 MG/100ML; MG/100ML; MG/100ML; MG/100ML
INJECTION, SOLUTION INTRAVENOUS CONTINUOUS
Status: DISCONTINUED | OUTPATIENT
Start: 2025-05-22 | End: 2025-05-22 | Stop reason: HOSPADM

## 2025-05-22 RX ORDER — OXYCODONE HYDROCHLORIDE 5 MG/1
5 TABLET ORAL EVERY 6 HOURS PRN
Qty: 12 TABLET | Refills: 0 | Status: SHIPPED | OUTPATIENT
Start: 2025-05-22 | End: 2025-05-25

## 2025-05-22 RX ORDER — KETOROLAC TROMETHAMINE 30 MG/ML
INJECTION, SOLUTION INTRAMUSCULAR; INTRAVENOUS
Status: DISCONTINUED | OUTPATIENT
Start: 2025-05-22 | End: 2025-05-22 | Stop reason: SDUPTHER

## 2025-05-22 RX ORDER — MIDAZOLAM HYDROCHLORIDE 2 MG/2ML
2 INJECTION, SOLUTION INTRAMUSCULAR; INTRAVENOUS
Status: CANCELLED | OUTPATIENT
Start: 2025-05-22

## 2025-05-22 RX ORDER — MIDAZOLAM HYDROCHLORIDE 1 MG/ML
INJECTION, SOLUTION INTRAMUSCULAR; INTRAVENOUS
Status: DISCONTINUED | OUTPATIENT
Start: 2025-05-22 | End: 2025-05-22 | Stop reason: SDUPTHER

## 2025-05-22 RX ORDER — DEXAMETHASONE SODIUM PHOSPHATE 4 MG/ML
INJECTION, SOLUTION INTRA-ARTICULAR; INTRALESIONAL; INTRAMUSCULAR; INTRAVENOUS; SOFT TISSUE
Status: DISCONTINUED | OUTPATIENT
Start: 2025-05-22 | End: 2025-05-22 | Stop reason: SDUPTHER

## 2025-05-22 RX ORDER — PROPOFOL 10 MG/ML
INJECTION, EMULSION INTRAVENOUS
Status: DISCONTINUED | OUTPATIENT
Start: 2025-05-22 | End: 2025-05-22 | Stop reason: SDUPTHER

## 2025-05-22 RX ORDER — HYDROMORPHONE HYDROCHLORIDE 2 MG/ML
INJECTION, SOLUTION INTRAMUSCULAR; INTRAVENOUS; SUBCUTANEOUS
Status: DISCONTINUED | OUTPATIENT
Start: 2025-05-22 | End: 2025-05-22 | Stop reason: SDUPTHER

## 2025-05-22 RX ORDER — ROCURONIUM BROMIDE 10 MG/ML
INJECTION, SOLUTION INTRAVENOUS
Status: DISCONTINUED | OUTPATIENT
Start: 2025-05-22 | End: 2025-05-22 | Stop reason: SDUPTHER

## 2025-05-22 RX ADMIN — HYDROMORPHONE HYDROCHLORIDE 1 MG: 2 INJECTION, SOLUTION INTRAMUSCULAR; INTRAVENOUS; SUBCUTANEOUS at 11:23

## 2025-05-22 RX ADMIN — HYDROMORPHONE HYDROCHLORIDE 0.5 MG: 2 INJECTION, SOLUTION INTRAMUSCULAR; INTRAVENOUS; SUBCUTANEOUS at 11:51

## 2025-05-22 RX ADMIN — SUGAMMADEX 200 MG: 100 INJECTION, SOLUTION INTRAVENOUS at 12:23

## 2025-05-22 RX ADMIN — DEXAMETHASONE SODIUM PHOSPHATE 4 MG: 4 INJECTION INTRA-ARTICULAR; INTRALESIONAL; INTRAMUSCULAR; INTRAVENOUS; SOFT TISSUE at 11:37

## 2025-05-22 RX ADMIN — INSULIN GLARGINE 24 UNITS: 100 INJECTION, SOLUTION SUBCUTANEOUS at 08:34

## 2025-05-22 RX ADMIN — PROPOFOL 40 MG: 10 INJECTION, EMULSION INTRAVENOUS at 11:55

## 2025-05-22 RX ADMIN — PANTOPRAZOLE SODIUM 40 MG: 40 TABLET, DELAYED RELEASE ORAL at 06:25

## 2025-05-22 RX ADMIN — ONDANSETRON HYDROCHLORIDE 4 MG: 2 SOLUTION INTRAMUSCULAR; INTRAVENOUS at 11:37

## 2025-05-22 RX ADMIN — PIPERACILLIN AND TAZOBACTAM 3375 MG: 3; .375 INJECTION, POWDER, LYOPHILIZED, FOR SOLUTION INTRAVENOUS at 08:37

## 2025-05-22 RX ADMIN — MIDAZOLAM HYDROCHLORIDE 2 MG: 1 INJECTION, SOLUTION INTRAMUSCULAR; INTRAVENOUS at 11:23

## 2025-05-22 RX ADMIN — SODIUM CHLORIDE, POTASSIUM CHLORIDE, SODIUM LACTATE AND CALCIUM CHLORIDE: 600; 310; 30; 20 INJECTION, SOLUTION INTRAVENOUS at 11:04

## 2025-05-22 RX ADMIN — LIDOCAINE HYDROCHLORIDE 80 MG: 20 INJECTION, SOLUTION EPIDURAL; INFILTRATION; INTRACAUDAL; PERINEURAL at 11:23

## 2025-05-22 RX ADMIN — PROPOFOL 160 MG: 10 INJECTION, EMULSION INTRAVENOUS at 11:27

## 2025-05-22 RX ADMIN — KETOROLAC TROMETHAMINE 30 MG: 30 INJECTION, SOLUTION INTRAMUSCULAR at 11:42

## 2025-05-22 RX ADMIN — INSULIN LISPRO 2 UNITS: 100 INJECTION, SOLUTION INTRAVENOUS; SUBCUTANEOUS at 08:34

## 2025-05-22 RX ADMIN — HYDROMORPHONE HYDROCHLORIDE 0.5 MG: 2 INJECTION, SOLUTION INTRAMUSCULAR; INTRAVENOUS; SUBCUTANEOUS at 11:43

## 2025-05-22 RX ADMIN — SODIUM CHLORIDE, PRESERVATIVE FREE 10 ML: 5 INJECTION INTRAVENOUS at 08:37

## 2025-05-22 RX ADMIN — PIPERACILLIN AND TAZOBACTAM 3375 MG: 3; .375 INJECTION, POWDER, LYOPHILIZED, FOR SOLUTION INTRAVENOUS at 00:28

## 2025-05-22 RX ADMIN — CEFAZOLIN SODIUM 2000 MG: 1 POWDER, FOR SOLUTION INTRAMUSCULAR; INTRAVENOUS at 11:38

## 2025-05-22 RX ADMIN — ROCURONIUM BROMIDE 50 MG: 10 INJECTION INTRAVENOUS at 11:27

## 2025-05-22 ASSESSMENT — PAIN - FUNCTIONAL ASSESSMENT: PAIN_FUNCTIONAL_ASSESSMENT: 0-10

## 2025-05-22 NOTE — PROGRESS NOTES
Shila Jean PA-C                       [phone] office             Monday-Friday 8:00 am-4:30 pm  I am not permitted to use \"perfect serve\" use above for contact, thanks.        Gastroenterology Progress Note    May 22, 2025  Admit Date: 5/20/2025         Narrative Assessment and Plan   35 y.o. female s/p ERCP for choledocholithiasis yesterday.  Patient is resting comfortably in bed this morning without abdominal pain, nausea, vomiting.  Abdomen is soft and nontender on exam.  WBC within normal limits at 10.9.  LFTs continue to improve: Today, AST 99, , alk phos 178, total bilirubin 2.0.    Impression:  Choledocholithiasis, s/p ERCP    Plan:  Cholecystectomy plan per general surgery.  Inpatient GI to sign off, please reconsult if needed further.    Shila Jean PA-C    Subjective:   Chief Complaint: Follow-up abdominal pain, resolved    HPI: Patient resting comfortably in bed, denies significant abdominal pain, nausea, or vomiting.  Reviewed findings from ERCP yesterday.  Reports overall she is feeling well and awaiting cholecystectomy.    Remote  Debbi, ID # [id number], provided translation services for this encounter.      ERCP by Dr. Forte:  Impression: Choledocholithiasis and biliary sludge removed with endoscopic sphincterotomy and balloon sweep.  The whitish-yellow characteristics of sludge might suggest early cholangitis so occlusive imaging or injection of contrast avoided.     ROS:  The previous review of systems on initial consultation / H&P is noted and reviewed.  Specific changes noted above in HPI.    Current Medications:     Current Facility-Administered Medications   Medication Dose Route Frequency    insulin glargine (LANTUS) injection vial 24 Units  24 Units SubCUTAneous Daily    prochlorperazine (COMPAZINE) injection 10 mg  10 mg IntraVENous Q6H PRN    glucose

## 2025-05-22 NOTE — ANESTHESIA POSTPROCEDURE EVALUATION
Department of Anesthesiology  Postprocedure Note    Patient: Ashleigh Ospina  MRN: [id number]  YOB: 1990  Date of evaluation: 5/22/2025    Procedure Summary       Date: 05/22/25 Room / Location: [facility] MAIN OR  / [facility] MAIN OR    Anesthesia Start: 1123 Anesthesia Stop: 1232    Procedure: CHOLECYSTECTOMY LAPAROSCOPIC (Abdomen) Diagnosis:       Cholecystitis      (Cholecystitis [K81.9])    Surgeons: Shaan Phipps MD Responsible Provider: Donovan Mondragon MD    Anesthesia Type: general ASA Status: 2            Anesthesia Type: No value filed.    Mony Phase I: Mony Score: 10    Mony Phase II: Mony Score: 10    Anesthesia Post Evaluation    Patient location during evaluation: PACU  Patient participation: complete - patient participated  Level of consciousness: awake  Airway patency: patent  Nausea & Vomiting: no vomiting and no nausea  Cardiovascular status: hemodynamically stable  Respiratory status: acceptable  Hydration status: stable  Pain management: adequate    No notable events documented.

## 2025-05-22 NOTE — PROGRESS NOTES
Patient interviewed and examined again.  No change to plan.     Shaan Phipps MD  [facility]  Office:  [phone]  Fax:  [phone]

## 2025-05-22 NOTE — DISCHARGE SUMMARY
Discharge Summary     PATIENT ID: Ashleigh Ospina  MRN: [id number]   YOB: 1990    DATE OF ADMISSION: 5/20/2025  2:06 AM    DATE OF DISCHARGE: 5/22/2025  PRIMARY CARE PROVIDER: Chalo Duncan MD     ATTENDING PHYSICIAN: Celio Oleary MD  DISCHARGING PROVIDER: Celio Oleary MD    To contact this individual call [phone] and ask the  to page.  If unavailable ask to be transferred the Adult Hospitalist Department.    CONSULTATIONS: IP CONSULT TO GI  IP CONSULT TO GENERAL SURGERY  IP CONSULT TO DIABETES MANAGEMENT    PROCEDURES/SURGERIES: Procedure(s):  CHOLECYSTECTOMY LAPAROSCOPIC    RECOMMENDATIONS FOR PCP/Follow up provider:  Discuss diabetes and low fat diet    Ms. Soraya Ospina is a 35 y.o. female who is being admitted for cholelithiasis with choledocholithiasis. Ms. Soraya Ospina presented to our [facility] today complaining of a now persistent RUQ abdominal pain, radiating to her back that started 2 days ago. She has associated nausea bit not vomiting. No fever or chills. No alcohol use and no urinary symptoms. In the ED, she was found to have elevated LFTs and a CT scan abdomen and pelvis done showed cholelithiasis and choledocholithiasis. Hepatic steatosis.           Interval history / Subjective:   5/21  No complaints    DISCHARGE DIAGNOSES / PLAN:      Cholelithiasis with choledocholithiasis and Transaminitis POA: likely has a CBD obstruction.  IV dilaudid PRN, IV fluids. Consult both general surgery and GI. Zosyn started by GI.  ERCP 5/21 showed choledocholithiasis with biliary sludge, removed with endoscopic sphincterotomy and balloon sweep.  Possible signs of early cholangitis: Continue Zosyn until ~5/23 per GI. Discharged on 2 days of augmentin. Cholecystectomy with surgery 5/22.     Type 2 diabetes mellitus with hyperglycemia, without

## 2025-05-22 NOTE — ANESTHESIA PRE PROCEDURE
Department of Anesthesiology  Preprocedure Note       Name:  Ashleigh Ospina   Age:  35 y.o.  :  1990                                          MRN:  [id number]         Date:  2025      Surgeon: Surgeon(s):  Shaan Phipps MD    Procedure: Procedure(s):  CHOLECYSTECTOMY LAPAROSCOPIC    Medications prior to admission:   Prior to Admission medications    Medication Sig Start Date End Date Taking? Authorizing Provider   metFORMIN (GLUCOPHAGE) 1000 MG tablet 1 po bid after meals 25   Chalo Duncan MD   glipiZIDE (GLUCOTROL) 5 MG tablet Take 1 tab po bid 30 min ac dinner 25   Deborah Isabel APRN - NP       Current medications:    Current Facility-Administered Medications   Medication Dose Route Frequency Provider Last Rate Last Admin   • insulin glargine (LANTUS) injection vial 24 Units  24 Units SubCUTAneous Daily Bharath Desouza APRN - CNS   24 Units at 25 0834   • ceFAZolin (ANCEF) 2,000 mg in sterile water 20 mL IV syringe  2,000 mg IntraVENous Once Shaan Phipps MD       • prochlorperazine (COMPAZINE) injection 10 mg  10 mg IntraVENous Q6H PRN Celio Oleary MD       • glucose chewable tablet 16 g  4 tablet Oral PRN Bharath Desouza APRN - CNS       • dextrose bolus 10% 125 mL  125 mL IntraVENous PRN Bharath Desouza APRN - CNS        Or   • dextrose bolus 10% 250 mL  250 mL IntraVENous PRN Bharath Desouza APRN - CNS       • glucagon injection 1 mg  1 mg SubCUTAneous PRN Bharath Desouza APRN - CNS       • dextrose 10 % infusion   IntraVENous Continuous PRN Bharath Desouza APRN - CNS       • sodium chloride flush 0.9 % injection 5-40 mL  5-40 mL IntraVENous 2 times per day Delano Dumont MD   10 mL at 25 0837   • sodium chloride flush 0.9 % injection 5-40 mL  5-40 mL IntraVENous PRN Delano Dumont MD       • 0.9 % sodium chloride infusion   IntraVENous PRN Delano Dumont MD       • potassium chloride (KLOR-CON) extended release tablet

## 2025-05-22 NOTE — PLAN OF CARE
Problem: Chronic Conditions and Co-morbidities  Goal: Patient's chronic conditions and co-morbidity symptoms are monitored and maintained or improved  Outcome: Progressing     Problem: Safety - Adult  Goal: Free from fall injury  Outcome: Progressing     Problem: Pain  Goal: Verbalizes/displays adequate comfort level or baseline comfort level  Outcome: Progressing  Flowsheets (Taken 5/21/2025 1919)  Verbalizes/displays adequate comfort level or baseline comfort level: Encourage patient to monitor pain and request assistance

## 2025-05-23 ENCOUNTER — TELEPHONE (OUTPATIENT)
Age: 35
End: 2025-05-23

## 2025-05-23 NOTE — OP NOTE
Operative Note      Patient: Ashleigh Ospina  YOB: 1990  MRN: [id number]    Date of Procedure: 5/22/2025    Pre-Op Diagnosis Codes:      * Cholecystitis [K81.9]    Post-Op Diagnosis: Post-Op Diagnosis Codes:     * Cholecystitis [K81.9]       Procedure(s):  CHOLECYSTECTOMY LAPAROSCOPIC    Surgeon(s):  Shaan Phipps MD    Assistant:   Surgical Assistant: Darlene Armijo    Anesthesia: General    Estimated Blood Loss (mL): Minimal    Complications: None    Specimens:   ID Type Source Tests Collected by Time Destination   1 : GALLBLADDER AND CONTENTS Tissue Gallbladder SURGICAL PATHOLOGY Shaan Phipps MD 5/22/2025 1158        Implants:  * No implants in log *      Drains: * No LDAs found *    Findings:  Infection Present At Time Of Surgery (PATOS) (choose all levels that have infection present):  No infection present  Other Findings: Cholecystitis    Detailed Description of Procedure:   Indication:  See paper H/P.    Procedure:  After standard pre-anesthetic and pre-operative procedure nursing protocols, general anesthesia instituted.  Wth the patient supine on the operating table, the abdomen was cleaned, prepped, and draped in usual sterile fashion. The laparoscopic camera and CO2 insufflation apparatus were affixed to the drapes in the usual fashion.  Pre-incision antibiotics were given 30 minutes prior to skin incision.    Pre-incision liposomal bupivicaine and 0.5% plain marcaine anesthetic was injected in the usual port sites of the skin.  Through a 5mm horizontal right para-umbilical skin incision, the abdomen was entered under direct camera vision with a 5 mm blunt tissue dissecting port.  Insufflation was established satisfactorily and maintained at 15mm.  The laparoscopic camera was re-introduced and confirmed no gross evidence of intraabdominal visceral injury or bleeding in attaining access.  Final midline horizontal port incisions were made, and under direct laparoscopic vision 5  mm and 12mm ventral ports were introduced.   The patient was positioned in reverse Trendelenburg with left tilt. The fundus was grasped and lifted up towards the diaphragm. The infundibulum was retracted laterally and inferiorly.  There was thickened peritoneum here and required meticulous dissection in order to completely free the infundibulum and cystic duct.  The junction of the cystic duct and gallbladder were clearly identified, and an adequate length of the cystic duct was dissected out. Similarly, the cystic artery was also dissected out and an adequate length was displayed. The critical view of Calot's triangle was obtained and the structures were clearly identified.       The cystic duct was clipped high on the infundibulum as possible.  Proximal cystic duct was clipped three times and divided with laparoscopic scissors.  The cystic artery was clipped twice on the stay side and divided with laparoscopic scissors.  With electrocautery, the gallbladder was gently dissected completely from the liver bed and placed in a retrieval bag.   Hemostasis was satisfactory.  The 5mm ports were removed under direct laparoscopic vision with no concern for preperitoneal or rectus bleeding.  The remaining local anesthetic was injected in the pre-peritoneal plane, fascia and subcutaneous tissue at each trocar site under direct endoscopic vision.  The gallbladder, instruments and ports were removed from the field and pneumoperitoneum terminally released.    All skin incisions were closed with subcuticular 4-0 Monocryl and surgical glue. The patient awoke in satisfactory condition.      Shaan Phipps MD     Electronically signed by Shaan Phipps MD on 5/23/2025 at 7:36 PM

## 2025-05-23 NOTE — TELEPHONE ENCOUNTER
T/C made to the patient with assistance from [facility]  #[id number] to follow-up after her [facility] ED to hospital admission 25 through 25 for c/o: ABD pain and nausea  Dx: Cholelithiasis with choledocholithiasis and elevated LFTs    CT ABD/Pelvis: Cholelithiasis and choledocholithiasis. Hepatic steatosis.     ERCP on 25   Lap Monik on 25    Labs: abnormal results in the: CBC, CMP, UA, Bilirubin Confirmation, Acetaminophen Level, Hemoglobin A1C, Beta-Hydroxybutyrate, POCT Glucose    Rxs: Amoxicillin-clavulanate, Oxycodone, Ondansetron    Recommended follow-up per the AVS: pcp and in 2 weeks with general surgeon, Dr. Phipps. Patient to call to schedule the follow-up appointments. Confirmed with the patient that she will call Dr. Phipps's office to schedule her surgical follow-up appointment.    Per the patient, she is feeling fairly well since discharge from the hospital and has intermittent pain in her R shoulder and abdomen which is relieved by the prescribed Oxycodone. Patient stated she is also taking Amoxicillin-clavulanate, Ondansetron, Metformin and Glipizide and gradually advancing her diet. She stated that her lap sites are c/d/I and that she is ambulating regularly and is not having any difficulty with urination or bowel movements. Per the patient, she has a glucometer and will begin using it daily to check fasting morning blood sugars and will keep a log of the readings to bring with her to her medical appointments. She denied having any nausea, vomiting, diarrhea, fever or shortness of breath and stated that her LMP began 25.    Per the patient, she tries to follow a diabetic diet and exercise regularly. She denied having any difficulty with her vision and stated she has not had a diabetic eye exam since .    Patient's enrollment in the [employer] referral program  on 21. Per the patient, she completed an application for the [employer]

## 2025-05-29 ENCOUNTER — OFFICE VISIT (OUTPATIENT)
Age: 35
End: 2025-05-29

## 2025-05-29 VITALS
TEMPERATURE: 97.3 F | SYSTOLIC BLOOD PRESSURE: 121 MMHG | HEART RATE: 88 BPM | DIASTOLIC BLOOD PRESSURE: 77 MMHG | OXYGEN SATURATION: 99 % | WEIGHT: 157 LBS | BODY MASS INDEX: 30.66 KG/M2

## 2025-05-29 DIAGNOSIS — Z09 HOSPITAL DISCHARGE FOLLOW-UP: ICD-10-CM

## 2025-05-29 DIAGNOSIS — E11.9 TYPE 2 DIABETES MELLITUS WITHOUT COMPLICATION, WITHOUT LONG-TERM CURRENT USE OF INSULIN (HCC): Primary | ICD-10-CM

## 2025-05-29 LAB — GLUCOSE, POC: NORMAL MG/DL

## 2025-05-29 PROCEDURE — 3046F HEMOGLOBIN A1C LEVEL >9.0%: CPT | Performed by: FAMILY MEDICINE

## 2025-05-29 PROCEDURE — 99214 OFFICE O/P EST MOD 30 MIN: CPT | Performed by: FAMILY MEDICINE

## 2025-05-29 PROCEDURE — 82962 GLUCOSE BLOOD TEST: CPT | Performed by: FAMILY MEDICINE

## 2025-05-29 PROCEDURE — 1111F DSCHRG MED/CURRENT MED MERGE: CPT | Performed by: FAMILY MEDICINE

## 2025-05-29 RX ORDER — GLIPIZIDE 10 MG/1
10 TABLET ORAL
Qty: 180 TABLET | Refills: 1 | Status: SHIPPED | OUTPATIENT
Start: 2025-05-29

## 2025-05-29 RX ORDER — DAPAGLIFLOZIN 5 MG/1
5 TABLET, FILM COATED ORAL EVERY MORNING
Qty: 90 TABLET | Refills: 3
Start: 2025-05-29

## 2025-05-29 ASSESSMENT — ENCOUNTER SYMPTOMS
ABDOMINAL PAIN: 0
SHORTNESS OF BREATH: 0
DIARRHEA: 0
CONSTIPATION: 0
NAUSEA: 0
COUGH: 0

## 2025-05-29 ASSESSMENT — PATIENT HEALTH QUESTIONNAIRE - PHQ9
1. LITTLE INTEREST OR PLEASURE IN DOING THINGS: NOT AT ALL
SUM OF ALL RESPONSES TO PHQ QUESTIONS 1-9: 0
2. FEELING DOWN, DEPRESSED OR HOPELESS: NOT AT ALL
SUM OF ALL RESPONSES TO PHQ QUESTIONS 1-9: 0

## 2025-05-29 NOTE — PROGRESS NOTES
Ashleigh Ospina was seen at discharge. Patient verified their full name and . After visit Summary provided and reviewed with patient. RN advised patient when provider recommends to return for follow-up visit in 3 months. RN reviewed the provider's instructions with the patient, including medication instructions. Patient verbalized her understanding and denies having any further questions at this time. Patient was assisted to fill out the PAP application.  Jase Cummins RN   
Results for orders placed or performed in visit on 05/29/25   AMB POC GLUCOSE BLOOD, BY GLUCOSE MONITORING DEVICE   Result Value Ref Range    Glucose,  nf MG/DL       
for chest pain and palpitations.   Gastrointestinal:  Negative for abdominal pain, constipation, diarrhea and nausea.   Endocrine: Negative for polydipsia and polyuria.   Neurological:  Negative for dizziness, numbness and headaches.         /77   Pulse 88   Temp 97.3 °F (36.3 °C) (Temporal)   Wt 71.2 kg (157 lb)   LMP 05/09/2025 (Approximate)   SpO2 99%   BMI 30.66 kg/m²     Physical Exam  Constitutional:       General: She is not in acute distress.     Appearance: Normal appearance.   HENT:      Mouth/Throat:      Mouth: Mucous membranes are moist.      Pharynx: Oropharynx is clear. No oropharyngeal exudate or posterior oropharyngeal erythema.   Eyes:      Extraocular Movements: Extraocular movements intact.      Conjunctiva/sclera: Conjunctivae normal.      Pupils: Pupils are equal, round, and reactive to light.   Cardiovascular:      Rate and Rhythm: Normal rate and regular rhythm.      Pulses: Normal pulses.      Heart sounds: Normal heart sounds.   Pulmonary:      Effort: Pulmonary effort is normal.      Breath sounds: Normal breath sounds.   Abdominal:      General: Bowel sounds are normal.      Palpations: Abdomen is soft.      Comments: Incision sites c/d/I.    Musculoskeletal:      Cervical back: No tenderness.   Lymphadenopathy:      Cervical: No cervical adenopathy.   Neurological:      Mental Status: She is alert and oriented to person, place, and time.            Hemoglobin A1C   Date Value Ref Range Status   05/21/2025 9.5 (H) 4.0 - 5.6 % Final     Comment:     (NOTE)  HbA1C Interpretive Ranges  <5.7              Normal  5.7 - 6.4         Consider Prediabetes  >6.5              Consider Diabetes

## 2025-06-26 ENCOUNTER — CLINICAL DOCUMENTATION (OUTPATIENT)
Age: 35
End: 2025-06-26

## 2025-06-26 NOTE — PROGRESS NOTES
Patient's recent application for Farxiga 5mg was denied by the [place] [employer] [place] patient assistance program because they determined that she may be financially eligible for Medicaid and therefore they will need a denial of Medicaid benefits letter in  order to reconsider her application. Nevaeh Read RN

## 2025-08-01 ENCOUNTER — HOSPITAL ENCOUNTER (OUTPATIENT)
Facility: HOSPITAL | Age: 35
Setting detail: SPECIMEN
Discharge: HOME | End: 2025-08-04

## 2025-08-01 ENCOUNTER — OFFICE VISIT (OUTPATIENT)
Age: 35
End: 2025-08-01

## 2025-08-01 VITALS
OXYGEN SATURATION: 96 % | DIASTOLIC BLOOD PRESSURE: 72 MMHG | WEIGHT: 160 LBS | HEART RATE: 89 BPM | TEMPERATURE: 97.9 F | BODY MASS INDEX: 31.25 KG/M2 | SYSTOLIC BLOOD PRESSURE: 117 MMHG

## 2025-08-01 DIAGNOSIS — Z71.89 COUNSELING AND COORDINATION OF CARE: Primary | ICD-10-CM

## 2025-08-01 DIAGNOSIS — E11.9 TYPE 2 DIABETES MELLITUS WITHOUT COMPLICATION, WITHOUT LONG-TERM CURRENT USE OF INSULIN (HCC): Primary | ICD-10-CM

## 2025-08-01 LAB
CREAT UR-MCNC: 94.5 MG/DL (ref 28–217)
ERYTHROCYTE [DISTWIDTH] IN BLOOD BY AUTOMATED COUNT: 11.8 % (ref 11.5–14.5)
EST. AVERAGE GLUCOSE BLD GHB EST-MCNC: 236 MG/DL
GLUCOSE, POC: NORMAL MG/DL
HBA1C MFR BLD: 9.8 % (ref 4–5.6)
HCT VFR BLD AUTO: 38 % (ref 35–47)
HGB BLD-MCNC: 12.8 G/DL (ref 11.5–16)
MCH RBC QN AUTO: 28.8 PG (ref 26–34)
MCHC RBC AUTO-ENTMCNC: 33.7 G/DL (ref 30–36.5)
MCV RBC AUTO: 85.6 FL (ref 80–99)
MICROALBUMIN UR-MCNC: <1.2 MG/DL
MICROALBUMIN/CREAT UR-RTO: NORMAL MG/G
NRBC # BLD: 0 K/UL (ref 0–0.01)
NRBC BLD-RTO: 0 PER 100 WBC
PLATELET # BLD AUTO: 350 K/UL (ref 150–400)
PMV BLD AUTO: 9.8 FL (ref 8.9–12.9)
RBC # BLD AUTO: 4.44 M/UL (ref 3.8–5.2)
WBC # BLD AUTO: 10.6 K/UL (ref 3.6–11)

## 2025-08-01 PROCEDURE — 82570 ASSAY OF URINE CREATININE: CPT

## 2025-08-01 PROCEDURE — 3046F HEMOGLOBIN A1C LEVEL >9.0%: CPT | Performed by: FAMILY MEDICINE

## 2025-08-01 PROCEDURE — 84443 ASSAY THYROID STIM HORMONE: CPT

## 2025-08-01 PROCEDURE — 80053 COMPREHEN METABOLIC PANEL: CPT

## 2025-08-01 PROCEDURE — 83036 HEMOGLOBIN GLYCOSYLATED A1C: CPT

## 2025-08-01 PROCEDURE — 82962 GLUCOSE BLOOD TEST: CPT | Performed by: FAMILY MEDICINE

## 2025-08-01 PROCEDURE — 82607 VITAMIN B-12: CPT

## 2025-08-01 PROCEDURE — 82043 UR ALBUMIN QUANTITATIVE: CPT

## 2025-08-01 PROCEDURE — 99213 OFFICE O/P EST LOW 20 MIN: CPT | Performed by: FAMILY MEDICINE

## 2025-08-01 PROCEDURE — 85027 COMPLETE CBC AUTOMATED: CPT

## 2025-08-01 SDOH — HEALTH STABILITY: PHYSICAL HEALTH: ON AVERAGE, HOW MANY DAYS PER WEEK DO YOU ENGAGE IN MODERATE TO STRENUOUS EXERCISE (LIKE A BRISK WALK)?: 0 DAYS

## 2025-08-01 SDOH — HEALTH STABILITY: PHYSICAL HEALTH: ON AVERAGE, HOW MANY MINUTES DO YOU ENGAGE IN EXERCISE AT THIS LEVEL?: 0 MIN

## 2025-08-01 ASSESSMENT — PATIENT HEALTH QUESTIONNAIRE - PHQ9
2. FEELING DOWN, DEPRESSED OR HOPELESS: SEVERAL DAYS
SUM OF ALL RESPONSES TO PHQ QUESTIONS 1-9: 1
1. LITTLE INTEREST OR PLEASURE IN DOING THINGS: NOT AT ALL

## 2025-08-01 ASSESSMENT — SOCIAL DETERMINANTS OF HEALTH (SDOH)
HOW OFTEN DO YOU ATTEND CHURCH OR RELIGIOUS SERVICES?: MORE THAN 4 TIMES PER YEAR
IN A TYPICAL WEEK, HOW MANY TIMES DO YOU TALK ON THE PHONE WITH FAMILY, FRIENDS, OR NEIGHBORS?: MORE THAN THREE TIMES A WEEK
DO YOU BELONG TO ANY CLUBS OR ORGANIZATIONS SUCH AS CHURCH GROUPS UNIONS, FRATERNAL OR ATHLETIC GROUPS, OR SCHOOL GROUPS?: YES
HOW OFTEN DO YOU GET TOGETHER WITH FRIENDS OR RELATIVES?: MORE THAN THREE TIMES A WEEK
HOW OFTEN DO YOU ATTENT MEETINGS OF THE CLUB OR ORGANIZATION YOU BELONG TO?: MORE THAN 4 TIMES PER YEAR
ARE YOU MARRIED, WIDOWED, DIVORCED, SEPARATED, NEVER MARRIED, OR LIVING WITH A PARTNER?: LIVING WITH PARTNER

## 2025-08-01 ASSESSMENT — ENCOUNTER SYMPTOMS
COUGH: 0
SHORTNESS OF BREATH: 0
CHEST TIGHTNESS: 0

## 2025-08-01 NOTE — PROGRESS NOTES
Name and  confirmed w/ patient. An After Visit Summary was provided and all discharge instructions were reviewed with the patient including: f/up appt. Time for questions and answers provided, patient verbalized understanding. Patient discharged from clinic in stable condition.

## 2025-08-01 NOTE — PROGRESS NOTES
Ashleigh Ospina (: 1990) is a 35 y.o. female, Established patient, here for evaluation of the following chief complaint(s):  Diabetes (Follow up)       ASSESSMENT/PLAN:  1. Type 2 diabetes mellitus without complication, without long-term current use of insulin (HCC)  Similar control, recheck labs and consider trying to get Inovkana through [employer].  -     AMB POC GLUCOSE BLOOD, BY GLUCOSE MONITORING DEVICE  -     CBC; Future  -     Comprehensive Metabolic Panel; Future  -     Hemoglobin A1C; Future  -     Vitamin B12; Future  -     Albumin/Creatinine Ratio, Urine; Future  -     TSH; Future    Return for follow up F2F in 4 months for DM (may change upcoming appt with Dr Yadav); VV 1 week.    SUBJECTIVE:  Follow up for DM.  Prior pt of Dr Isabel.  Had an egg for breakfast.  DM:  Patient is taking Glipizide BID (higher dose), Metformin BID regularly.  Denies hypoglycemic spells.  Checks BG sporadically: 150-180.  Farxiga PAP was denied pending Medicaid refusal letter.    PMHx  Cholecystectomy 2025.    Review of Systems   Constitutional:  Negative for unexpected weight change.   Respiratory:  Negative for cough, chest tightness and shortness of breath.    Cardiovascular:  Negative for chest pain, palpitations and leg swelling.   Endocrine: Negative for polydipsia and polyuria.   Neurological:  Negative for light-headedness.     OBJECTIVE:  Blood pressure 117/72, pulse 89, temperature 97.9 °F (36.6 °C), temperature source Temporal, weight 72.6 kg (160 lb), SpO2 96%.  Physical Exam  CONSTITUTIONAL:  Well developed.  No apparent distress.  PSYCHIATRIC: Oriented to time, place, person & situation.  Appropriate mood and affect.  NECK:  Normal inspection, normal palpation without any lymphadenopathy, masses, or thyromegaly  CARDIOVASCULAR:  Regular rate and rhythm.  Normal S1, S2.  No extra sounds.  RESPIRATORY:  Normal effort.  Normal ascultation without wheezing.  ABDOMEN:  Normal bowel sounds.  Abdomen

## 2025-08-01 NOTE — PROGRESS NOTES
Results for orders placed or performed in visit on 08/01/25   AMB POC GLUCOSE BLOOD, BY GLUCOSE MONITORING DEVICE   Result Value Ref Range    Glucose,  nf MG/DL

## 2025-08-01 NOTE — PROGRESS NOTES
Patient came as a walk in to [facility] regarding medical bills. Per patient requested [facility] made copy of [facility] FA approval letter and sent by mail to [facility] and [facility]. Patient verbalized understanding the process.

## 2025-08-02 LAB
ALBUMIN SERPL-MCNC: 4 G/DL (ref 3.5–5.2)
ALBUMIN/GLOB SERPL: 1.2 (ref 1.1–2.2)
ALP SERPL-CCNC: 96 U/L (ref 35–104)
ALT SERPL-CCNC: 30 U/L (ref 10–35)
ANION GAP SERPL CALC-SCNC: 13 MMOL/L (ref 2–14)
AST SERPL-CCNC: 37 U/L (ref 10–35)
BILIRUB SERPL-MCNC: 0.4 MG/DL (ref 0–1.2)
BUN SERPL-MCNC: 9 MG/DL (ref 6–20)
BUN/CREAT SERPL: 21 (ref 12–20)
CALCIUM SERPL-MCNC: 9.6 MG/DL (ref 8.6–10)
CHLORIDE SERPL-SCNC: 97 MMOL/L (ref 98–107)
CO2 SERPL-SCNC: 23 MMOL/L (ref 20–29)
CREAT SERPL-MCNC: 0.44 MG/DL (ref 0.6–1)
GLOBULIN SER CALC-MCNC: 3.3 G/DL (ref 2–4)
GLUCOSE SERPL-MCNC: 172 MG/DL (ref 65–100)
POTASSIUM SERPL-SCNC: 4.6 MMOL/L (ref 3.5–5.1)
PROT SERPL-MCNC: 7.3 G/DL (ref 6.4–8.3)
SODIUM SERPL-SCNC: 132 MMOL/L (ref 136–145)
TSH, 3RD GENERATION: 1.23 UIU/ML (ref 0.27–4.2)
VIT B12 SERPL-MCNC: 528 PG/ML (ref 232–1245)

## 2025-08-12 ENCOUNTER — TELEMEDICINE (OUTPATIENT)
Age: 35
End: 2025-08-12

## 2025-08-12 DIAGNOSIS — E11.9 TYPE 2 DIABETES MELLITUS WITHOUT COMPLICATION, WITHOUT LONG-TERM CURRENT USE OF INSULIN (HCC): ICD-10-CM

## 2025-08-12 PROCEDURE — 99212 OFFICE O/P EST SF 10 MIN: CPT | Performed by: FAMILY MEDICINE

## 2025-08-12 RX ORDER — CANAGLIFLOZIN 100 MG/1
100 TABLET, FILM COATED ORAL
Qty: 90 TABLET | Refills: 3
Start: 2025-08-12

## 2025-09-02 ENCOUNTER — OFFICE VISIT (OUTPATIENT)
Age: 35
End: 2025-09-02

## 2025-09-02 DIAGNOSIS — Z71.89 COUNSELING AND COORDINATION OF CARE: Primary | ICD-10-CM

## (undated) DEVICE — RETRIEVAL BALLOON CATHETER: Brand: EXTRACTOR™ PRO RX

## (undated) DEVICE — LIQUIBAND RAPID ADHESIVE 36/CS 0.8ML: Brand: MEDLINE

## (undated) DEVICE — ELECTRODE LAP L36CM PTFE WIRE J HK CLEANCOAT

## (undated) DEVICE — SUTURE MONOCRYL + SZ 4-0 L27IN ABSRB UD L19MM PS-2 3/8 CIR MCP426H

## (undated) DEVICE — GUIDEWIRE ENDOSCP WRK L450CM DIA0.035IN STD SHFT STR RND

## (undated) DEVICE — GENERAL LAPAROSCOPY-SFMC: Brand: MEDLINE INDUSTRIES, INC.

## (undated) DEVICE — APPLIER CLP M/L SHFT DIA5MM 15 LIG LIGAMAX 5

## (undated) DEVICE — SUTURE VICRYL + SZ 0 L27IN ABSRB VLT L26MM UR-6 5/8 CIR VCP603H

## (undated) DEVICE — Device: Brand: ENDO SMARTCAP

## (undated) DEVICE — LOCKING DEVICE AND BIOPSY CAP FOR USE WITH RX BILIARY SYSTEM: Brand: RX LOCKING DEVICE AND BIOPSY CAP

## (undated) DEVICE — BW-412T DISP COMBO CLEANING BRUSH: Brand: SINGLE USE COMBINATION CLEANING BRUSH

## (undated) DEVICE — SYRINGE MED 50ML LUERSLIP TIP

## (undated) DEVICE — Device

## (undated) DEVICE — CANISTER, RIGID, 3000CC: Brand: MEDLINE INDUSTRIES, INC.

## (undated) DEVICE — TRIPLE LUMEN NEEDLE KNIFE: Brand: RX NEEDLE KNIFE XL

## (undated) DEVICE — BLADE,CARBON-STEEL,11,STRL,DISPOSABLE,TB: Brand: MEDLINE

## (undated) DEVICE — GLOVE SURG SZ 7 L12IN FNGR THK79MIL GRN LTX FREE

## (undated) DEVICE — CANNULATING SPHINCTEROTOME: Brand: JAGTOME™ REVOLUTION RX

## (undated) DEVICE — KIT,1200CC CANISTER,3/16"X6' TUBING: Brand: MEDLINE INDUSTRIES, INC.

## (undated) DEVICE — SOLUTION IV 500ML 0.9% SOD BOTTLE CHL LTWT DURABLE SHATTERPROOF

## (undated) DEVICE — CLICKLINE SCISSORS INSERT: Brand: CLICKLINE

## (undated) DEVICE — ELECTRODE PT RET AD L9FT HI MOIST COND ADH HYDRGEL CORDED

## (undated) DEVICE — BALLOON DILATATION CATHETER: Brand: HURRICANE™ RX

## (undated) DEVICE — BITEBLOCK 54FR W/ DENT RIM BLOX

## (undated) DEVICE — NEEDLE INSUFFLATION 14 GAX120 MM SURGINEEDLE

## (undated) DEVICE — KIT,ANTI FOG,W/SPONGE & FLUID,SOFT PACK: Brand: MEDLINE

## (undated) DEVICE — ENDO CARRY-ON PROCEDURE KIT INCLUDES ENZYMATIC SPONGE, GAUZE, BIOHAZARD LABEL, TRAY, LUBRICANT, DIRTY SCOPE LABEL, WATER LABEL, TRAY, DRAWSTRING PAD, AND DEFENDO 4-PIECE KIT.: Brand: ENDO CARRY-ON PROCEDURE KIT

## (undated) DEVICE — LAPAROSCOPIC TROCAR SLEEVE/SINGLE USE: Brand: KII® OPTICAL ACCESS SYSTEM

## (undated) DEVICE — SYSTEM EVAC SMOKE LAPARSCOPIC

## (undated) DEVICE — TUBING, SUCTION, 1/4" X 10', STRAIGHT: Brand: MEDLINE

## (undated) DEVICE — GLOVE ORANGE PI 7 1/2   MSG9075

## (undated) DEVICE — TISSUE RETRIEVAL SYSTEM: Brand: INZII RETRIEVAL SYSTEM

## (undated) DEVICE — SNARE ENDOSCP M L240CM W27MM SHTH DIA2.4MM CHN 2.8MM OVL

## (undated) DEVICE — GLOVE ORANGE PI 7   MSG9070

## (undated) DEVICE — POSITIONER HD REST SUPINE LAT

## (undated) DEVICE — SUTURE MONOCRYL SZ 4-0 L18IN ABSRB UD L19MM PS-2 3/8 CIR PRIM Y496G

## (undated) DEVICE — INFLATION DEVICE: Brand: ENCORE 26

## (undated) DEVICE — TROCAR: Brand: KII® SLEEVE

## (undated) DEVICE — FORCEPS BX L240CM JAW DIA2.8MM L CAP W/ NDL MIC MESH TOOTH

## (undated) DEVICE — SOLUTION IRRIG 1000ML H2O PIC PLAS SHATTERPROOF CONTAINER

## (undated) DEVICE — TROCAR: Brand: KII® OPTICAL ACCESS SYSTEM

## (undated) DEVICE — COVER LT HNDL PLAS RIG 1 PER PK

## (undated) DEVICE — SOLUTION IV 1000 ML 0.9 NACL INJ USP EXCEL PLAS CONTAINER